# Patient Record
Sex: MALE | Race: WHITE
[De-identification: names, ages, dates, MRNs, and addresses within clinical notes are randomized per-mention and may not be internally consistent; named-entity substitution may affect disease eponyms.]

---

## 2019-01-29 NOTE — ULT
BILATERAL CAROTID DUPLEX ULTRASOUND INCLUDING COLOR AND SPECTRAL DOPPLER IMAGING:

 

History: 

Carotid bruit. 

 

FINDINGS: 

There is very extensive visual plaque with lumen diameter narrowing in both right and left proximal I
Amy, worse on the right side. 

 

PSV right  cm/sec,  cm/sec. ICA/CCA ratio is 2.3. 

 

PSV left  cm/sec, EDV 20 cm/sec. ICA/CCA ratio of 1.0. 

 

Vertebral flow is antegrade. 

 

IMPRESSION: 

Severe, greater than 70%, stenosis of the proximal right ICA. Consider non-emergent follow up CT andi
ogram for further assessment in this regard.

 

POS: ABDIRAHMAN

## 2019-02-08 ENCOUNTER — HOSPITAL ENCOUNTER (OUTPATIENT)
Dept: HOSPITAL 92 - BICULT | Age: 74
Discharge: HOME | End: 2019-02-08
Attending: INTERNAL MEDICINE
Payer: MEDICARE

## 2019-02-08 DIAGNOSIS — R93.1: ICD-10-CM

## 2019-02-08 DIAGNOSIS — K74.60: Primary | ICD-10-CM

## 2019-02-08 DIAGNOSIS — R16.1: ICD-10-CM

## 2019-02-08 PROCEDURE — 76705 ECHO EXAM OF ABDOMEN: CPT

## 2019-02-08 NOTE — ULT
HEPATIC ULTRASOUND:

 

HISTORY: 

Cirrhosis.

 

FINDINGS: 

This exam is technically limited due to bowel gas and patient body habitus.  A gallbladder is not néstor
ntified.  The patient does not state they have had a cholecystectomy.  The common duct is not well se
en but appears to be 3-4 mm.  The liver measures approximately 12 cm in length.  Mildly heterogeneous
, but no discrete abnormalities.

 

DOPPLER EVALUATION WITH SPECTRAL ANALYSIS:

Limited evaluation is seen, but flow patterns appear normal. 

 

The spleen is enlarged at 16.4 cm.  Pancreas region is obscured as is the abdominal aorta.  

 

IMPRESSION: 

1.  Technically limited examination.  The liver is mildly heterogeneous and shows no focal mass.  The
re is splenomegaly present.

 

2.  The gallbladder is not identified.

 

POS: [facility]

## 2019-02-22 ENCOUNTER — HOSPITAL ENCOUNTER (OUTPATIENT)
Dept: HOSPITAL 92 - BICCT | Age: 74
Discharge: HOME | End: 2019-02-22
Attending: FAMILY MEDICINE
Payer: MEDICARE

## 2019-02-22 DIAGNOSIS — J43.9: ICD-10-CM

## 2019-02-22 DIAGNOSIS — I65.21: Primary | ICD-10-CM

## 2019-02-22 DIAGNOSIS — I65.01: ICD-10-CM

## 2019-02-22 LAB — ESTIMATED GFR-MDRD - POC: (no result)

## 2019-02-22 PROCEDURE — 70498 CT ANGIOGRAPHY NECK: CPT

## 2019-02-22 PROCEDURE — 82565 ASSAY OF CREATININE: CPT

## 2019-02-22 NOTE — CT
CTA OF THE NECK UTILIZING IV CONTRAST AND 3D REFORMATTED IMAGING.

 

INDICATION: 

Carotid stenosis identified by carotid ultrasound dated 1/29/2019.

 

CONTRAST:

100 cc of Isovue 370 was utilized for the examination.  Three-D reformatted images were constructed f
rom the raw data at a separate work station.

 

FINDINGS: 

There is 70% luminal caliber narrowing involving the right internal carotid artery at its origin.  Th
ere is mild luminal caliber narrowing involving the proximal to mid left internal carotid artery that
 does not appear hemodynamically significantly stenosis.  The remaining cervical, petrous, precaverno
us, cavernous, and supraclinoid ICAs appear patent.  Visualized aspects of the MCAs, ACAs, and PCAs a
re patent.  There is mild to moderate luminal caliber narrowing involving the origin of the right alma
tebral artery that approaches 50%.  The left vertebral artery is widely patent.  The visualized aspec
ts of the basilar artery appear widely patent.

 

Visualized intracranial contents are unremarkable appearing.  The orbits are within normal limits.  T
he parotid, submandibular, and thyroid glands are normal appearing.  The visualized aerodigestive tra
ct appears within normal limits.  

 

There is severe emphysematous change involving the lung apices.  There is postsurgical change of a pr
ior CABG.  There is an ACDF plate involving C4 through C6.  There is multilevel spondylosis of the ce
rvical spine.

 

IMPRESSION: 

1.  70% luminal caliber narrowing involving the origin and proximal aspect of the right internal rivera
tid artery.

 

2.  50% luminal caliber narrowing involving the origin and proximal aspect of the cervical right vert
ebral artery.

 

3.  Severe emphysema.

 

POS: ABDIRAHMAN

## 2019-10-31 ENCOUNTER — HOSPITAL ENCOUNTER (OUTPATIENT)
Dept: HOSPITAL 92 - ULT | Age: 74
Discharge: HOME | End: 2019-10-31
Attending: PHYSICIAN ASSISTANT
Payer: MEDICARE

## 2019-10-31 DIAGNOSIS — R16.1: ICD-10-CM

## 2019-10-31 DIAGNOSIS — K74.60: Primary | ICD-10-CM

## 2019-10-31 DIAGNOSIS — I71.4: ICD-10-CM

## 2019-10-31 DIAGNOSIS — I85.00: ICD-10-CM

## 2019-10-31 PROCEDURE — 76705 ECHO EXAM OF ABDOMEN: CPT

## 2019-10-31 NOTE — ULT
HEPATIC SONOGRAM WITH DUPLEX EVALUATION:

 

Date:  10/31/19 

 

HISTORY:  

Cirrhosis. 

 

FINDINGS:

Gallbladder is not visualized on today's exam. No abnormalities are apparent. Common duct is 0.3 cm. 


 

Liver has a normal appearance without focal mass or intrahepatic biliary dilatation. No free fluid. 

 

The spleen measures up to 15.7 cm. Fusiform dilatation of the lower abdominal aorta measuring up to 4
.3 cm greatest AP diameter. 

 

Good color and spectral Doppler flow within the hepatic and splenic arteries. Portal venous flow is t
owards the liver. Hepatic venous flow is towards the IVC. 

 

IMPRESSION: 

1.  Moderate splenomegaly. Possibly related to portal venous hypertension. Appropriate directional po
rtal venous flow. 

 

2.  Fusiform lower abdominal aortic aneurysm, 4.3 cm. 

 

3.  Gallbladder not visualized on today's exam. 

 

 

POS: [facility]

## 2019-11-24 ENCOUNTER — HOSPITAL ENCOUNTER (INPATIENT)
Dept: HOSPITAL 92 - ERS | Age: 74
LOS: 9 days | Discharge: HOME | DRG: 280 | End: 2019-12-03
Attending: FAMILY MEDICINE | Admitting: FAMILY MEDICINE
Payer: MEDICARE

## 2019-11-24 VITALS — BODY MASS INDEX: 32.2 KG/M2

## 2019-11-24 DIAGNOSIS — I25.10: ICD-10-CM

## 2019-11-24 DIAGNOSIS — D69.3: ICD-10-CM

## 2019-11-24 DIAGNOSIS — I21.4: ICD-10-CM

## 2019-11-24 DIAGNOSIS — J18.9: ICD-10-CM

## 2019-11-24 DIAGNOSIS — I45.2: ICD-10-CM

## 2019-11-24 DIAGNOSIS — E11.9: ICD-10-CM

## 2019-11-24 DIAGNOSIS — K74.60: ICD-10-CM

## 2019-11-24 DIAGNOSIS — I11.0: Primary | ICD-10-CM

## 2019-11-24 DIAGNOSIS — Z95.1: ICD-10-CM

## 2019-11-24 DIAGNOSIS — Z79.82: ICD-10-CM

## 2019-11-24 DIAGNOSIS — D68.9: ICD-10-CM

## 2019-11-24 DIAGNOSIS — R16.1: ICD-10-CM

## 2019-11-24 DIAGNOSIS — N40.0: ICD-10-CM

## 2019-11-24 DIAGNOSIS — K21.9: ICD-10-CM

## 2019-11-24 DIAGNOSIS — I50.31: ICD-10-CM

## 2019-11-24 DIAGNOSIS — E78.5: ICD-10-CM

## 2019-11-24 DIAGNOSIS — I27.20: ICD-10-CM

## 2019-11-24 DIAGNOSIS — Z79.899: ICD-10-CM

## 2019-11-24 DIAGNOSIS — I48.4: ICD-10-CM

## 2019-11-24 LAB
ALBUMIN SERPL BCG-MCNC: 3.2 G/DL (ref 3.4–4.8)
ALP SERPL-CCNC: 96 U/L (ref 40–110)
ALT SERPL W P-5'-P-CCNC: 21 U/L (ref 8–55)
ANION GAP SERPL CALC-SCNC: 11 MMOL/L (ref 10–20)
AST SERPL-CCNC: 30 U/L (ref 5–34)
BASOPHILS # BLD AUTO: 0 THOU/UL (ref 0–0.2)
BASOPHILS NFR BLD AUTO: 0.1 % (ref 0–1)
BILIRUB SERPL-MCNC: 2.3 MG/DL (ref 0.2–1.2)
BUN SERPL-MCNC: 9 MG/DL (ref 8.4–25.7)
CALCIUM SERPL-MCNC: 8.8 MG/DL (ref 7.8–10.44)
CHLORIDE SERPL-SCNC: 106 MMOL/L (ref 98–107)
CK SERPL-CCNC: 44 U/L (ref 30–200)
CO2 SERPL-SCNC: 25 MMOL/L (ref 23–31)
CREAT CL PREDICTED SERPL C-G-VRATE: 0 ML/MIN (ref 70–130)
EOSINOPHIL # BLD AUTO: 0.1 THOU/UL (ref 0–0.7)
EOSINOPHIL NFR BLD AUTO: 1.4 % (ref 0–10)
GLOBULIN SER CALC-MCNC: 3.1 G/DL (ref 2.4–3.5)
GLUCOSE SERPL-MCNC: 137 MG/DL (ref 83–110)
HGB BLD-MCNC: 12.6 G/DL (ref 14–18)
LYMPHOCYTES # BLD: 0.5 THOU/UL (ref 1.2–3.4)
LYMPHOCYTES NFR BLD AUTO: 10.5 % (ref 21–51)
MCH RBC QN AUTO: 28.7 PG (ref 27–31)
MCV RBC AUTO: 88.4 FL (ref 78–98)
MDIFF COMPLETE?: YES
MONOCYTES # BLD AUTO: 0.4 THOU/UL (ref 0.11–0.59)
MONOCYTES NFR BLD AUTO: 8.5 % (ref 0–10)
NEUTROPHILS # BLD AUTO: 4 THOU/UL (ref 1.4–6.5)
NEUTROPHILS NFR BLD AUTO: 79.5 % (ref 42–75)
OVALOCYTES BLD QL SMEAR: (no result) (100X)
PLATELET # BLD AUTO: 39 THOU/UL (ref 130–400)
POLYCHROMASIA BLD QL SMEAR: (no result) (100X)
POTASSIUM SERPL-SCNC: 4.2 MMOL/L (ref 3.5–5.1)
RBC # BLD AUTO: 4.39 MILL/UL (ref 4.7–6.1)
SODIUM SERPL-SCNC: 138 MMOL/L (ref 136–145)
TROPONIN I SERPL DL<=0.01 NG/ML-MCNC: 0.17 NG/ML (ref ?–0.03)
TROPONIN I SERPL DL<=0.01 NG/ML-MCNC: 0.25 NG/ML (ref ?–0.03)
WBC # BLD AUTO: 5 THOU/UL (ref 4.8–10.8)

## 2019-11-24 PROCEDURE — 94640 AIRWAY INHALATION TREATMENT: CPT

## 2019-11-24 PROCEDURE — 80053 COMPREHEN METABOLIC PANEL: CPT

## 2019-11-24 PROCEDURE — 36430 TRANSFUSION BLD/BLD COMPNT: CPT

## 2019-11-24 PROCEDURE — 93005 ELECTROCARDIOGRAM TRACING: CPT

## 2019-11-24 PROCEDURE — 96361 HYDRATE IV INFUSION ADD-ON: CPT

## 2019-11-24 PROCEDURE — 82550 ASSAY OF CK (CPK): CPT

## 2019-11-24 PROCEDURE — 80048 BASIC METABOLIC PNL TOTAL CA: CPT

## 2019-11-24 PROCEDURE — 96365 THER/PROPH/DIAG IV INF INIT: CPT

## 2019-11-24 PROCEDURE — 71046 X-RAY EXAM CHEST 2 VIEWS: CPT

## 2019-11-24 PROCEDURE — 84443 ASSAY THYROID STIM HORMONE: CPT

## 2019-11-24 PROCEDURE — 86901 BLOOD TYPING SEROLOGIC RH(D): CPT

## 2019-11-24 PROCEDURE — 93798 PHYS/QHP OP CAR RHAB W/ECG: CPT

## 2019-11-24 PROCEDURE — 36415 COLL VENOUS BLD VENIPUNCTURE: CPT

## 2019-11-24 PROCEDURE — 87804 INFLUENZA ASSAY W/OPTIC: CPT

## 2019-11-24 PROCEDURE — 84479 ASSAY OF THYROID (T3 OR T4): CPT

## 2019-11-24 PROCEDURE — 85610 PROTHROMBIN TIME: CPT

## 2019-11-24 PROCEDURE — 86850 RBC ANTIBODY SCREEN: CPT

## 2019-11-24 PROCEDURE — 94760 N-INVAS EAR/PLS OXIMETRY 1: CPT

## 2019-11-24 PROCEDURE — 76536 US EXAM OF HEAD AND NECK: CPT

## 2019-11-24 PROCEDURE — 86900 BLOOD TYPING SEROLOGIC ABO: CPT

## 2019-11-24 PROCEDURE — 85379 FIBRIN DEGRADATION QUANT: CPT

## 2019-11-24 PROCEDURE — 93306 TTE W/DOPPLER COMPLETE: CPT

## 2019-11-24 PROCEDURE — 84481 FREE ASSAY (FT-3): CPT

## 2019-11-24 PROCEDURE — 96375 TX/PRO/DX INJ NEW DRUG ADDON: CPT

## 2019-11-24 PROCEDURE — 84439 ASSAY OF FREE THYROXINE: CPT

## 2019-11-24 PROCEDURE — 36416 COLLJ CAPILLARY BLOOD SPEC: CPT

## 2019-11-24 PROCEDURE — 83735 ASSAY OF MAGNESIUM: CPT

## 2019-11-24 PROCEDURE — 80076 HEPATIC FUNCTION PANEL: CPT

## 2019-11-24 PROCEDURE — 85730 THROMBOPLASTIN TIME PARTIAL: CPT

## 2019-11-24 PROCEDURE — 83605 ASSAY OF LACTIC ACID: CPT

## 2019-11-24 PROCEDURE — 71045 X-RAY EXAM CHEST 1 VIEW: CPT

## 2019-11-24 PROCEDURE — 71275 CT ANGIOGRAPHY CHEST: CPT

## 2019-11-24 PROCEDURE — 87040 BLOOD CULTURE FOR BACTERIA: CPT

## 2019-11-24 PROCEDURE — 83880 ASSAY OF NATRIURETIC PEPTIDE: CPT

## 2019-11-24 PROCEDURE — 85025 COMPLETE CBC W/AUTO DIFF WBC: CPT

## 2019-11-24 PROCEDURE — P9035 PLATELET PHERES LEUKOREDUCED: HCPCS

## 2019-11-24 PROCEDURE — 84484 ASSAY OF TROPONIN QUANT: CPT

## 2019-11-24 NOTE — RAD
XR Chest 1 View Portable



History: Cough



Comparison: Radiograph 2011



Findings: Heart size mildly enlarged. There is suggestion of mild pulmonary edema and pulmonary venou
s congestion. No pneumothorax. No significant effusion.



Multiple midline sternotomy wires.



Impression: Findings of congestive heart failure. Follow-up recommended.



Reported By: Darryn Alvarado 

Electronically Signed:  11/24/2019 4:29 PM

## 2019-11-24 NOTE — CT
CTA Angio Chest W WO Con



History: Cough



Comparison: Radiograph same day



Findings: CT in June chest performed after the intravenous ministration of contrast. 3-D rendering pr
ovided.



Pulmonary arteries are dilated. No proximal segmental pulmonary arterial filling defect.



Aortic contour is nonaneurysmal. Numerous mildly prominent paratracheal and prevascular lymph nodes.



The spleen is markedly enlarged. The gallbladder has cholelithiasis. There is fluid along the right p
aracolic gutter, incompletely evaluated.



No significant pericardial fluid.



Mild pulmonary venous congestion and early edema. There is some subpleural reticulation and lower lob
es.



No pneumothorax. No focal confluent airspace consolidation.



No thoracic spine compression fracture. There is contrast within the renal collecting systems on the 
examination indicating this is done during a delayed phase.



No displaced rib fracture.



Impression: 

1. Within the limits of this delayed exam, no pulmonary embolism.

2. Cardiomegaly and mild pulmonary venous congestion.

3. Markedly splenomegaly.

4. Cholelithiasis.

5. Small volume fluid right paracolic gutter may be sequelae of portal hypertension and hepatic dysfu
nction versus colitis.



Reported By: Darryn Alvarado 

Electronically Signed:  11/24/2019 5:45 PM

## 2019-11-25 LAB — TROPONIN I SERPL DL<=0.01 NG/ML-MCNC: 0.21 NG/ML (ref ?–0.03)

## 2019-11-25 RX ADMIN — ALOGLIPTIN SCH MG: 25 TABLET, FILM COATED ORAL at 09:42

## 2019-11-25 RX ADMIN — CEFTRIAXONE SCH MLS: 1 INJECTION, POWDER, FOR SOLUTION INTRAMUSCULAR; INTRAVENOUS at 17:47

## 2019-11-25 RX ADMIN — THERA TABS SCH TAB: TAB at 09:42

## 2019-11-25 RX ADMIN — ISOSORBIDE MONONITRATE SCH MG: 30 TABLET, EXTENDED RELEASE ORAL at 21:47

## 2019-11-25 RX ADMIN — ISOSORBIDE MONONITRATE SCH MG: 30 TABLET, EXTENDED RELEASE ORAL at 09:42

## 2019-11-25 RX ADMIN — AZITHROMYCIN SCH MLS: 500 INJECTION, POWDER, LYOPHILIZED, FOR SOLUTION INTRAVENOUS at 17:47

## 2019-11-25 RX ADMIN — Medication SCH ML: at 21:47

## 2019-11-25 NOTE — CON
DATE OF CONSULTATION:  



PRIMARY CARDIOLOGIST:  Dr. Barber Merrill.



REASON FOR CONSULTATION:  Shortness of breath, congestive heart failure, and

non-ST-elevation myocardial infarction. 



HISTORY OF PRESENT ILLNESS:  Mr. Hsu is a very pleasant 74-year-old gentleman,

patient of Dr. Merrill.  The patient initially had bypass surgery in 1991.  The

patient has had some chest pain off and on since then, but from what I can tell, no

intervention has been necessary.  He did undergo cardiac catheterization by Dr. Prajapati in the year 2000.  At that time, he was found to have 3-vessel coronary

artery disease with a patent internal mammary to the LAD, patent vein graft to the

diagonal, patent vein graft to marginal branch of the circumflex, patent graft to

the posterior descending artery.  He did have some diffuse distal disease. 



The patient was seen by Dr. Merrill a few times since then, doing well. 



The patient states that he has not been feeling well now for about 2 weeks.  He has

had a subjective fever for about 2 weeks and a cough he said, but he cannot cough

anything up.  He said he did not have a thermometer, but eventually got a

thermometer and it was sometimes at 100.6, but then yesterday went to 101.9.  He

decided to come to the emergency room.  He has been started on antibiotics. 



He did not have any shaking chills. 



The patient does not have any anginal chest pain.  He does have some chest pain with

a deep breath. 



The patient otherwise has been in relatively good physical condition.



MEDICATIONS:  

1. Saxagliptin/metformin.

2. Actos.

3. Oxybutynin.

4. Omeprazole.

5. Nadolol.

6. Isosorbide.

7. Glimepiride.

8. Benazepril.

9. Aspirin.

10. Niacin.



ALLERGIES:  NONE KNOWN.



REVIEW OF SYSTEMS:  CONSTITUTIONAL:  Positive for weakness, fatigue, and fever. 

VISION:  No changes. 

HEARING:  No changes. 

PULMONARY:  As above. 

CARDIAC:  No anginal chest pain. 

GASTROINTESTINAL:  No nausea, vomiting, or diarrhea. 

SKIN:  No rashes. 

NEUROLOGIC:  No unilateral weakness or numbness. 

PSYCHIATRIC:  No unusual depression or anxiety.



PHYSICAL EXAMINATION:

GENERAL:  This is a pleasant 74-year-old gentleman. 

VITAL SIGNS:  Blood pressure 149/86, pulse 66 and regular. 

EYES:  Sclerae nonicteric. 

MOUTH:  Mucous membranes moist. 

NECK:  Supple.  No lymphadenopathy. 

LUNGS:  He has bibasilar rales on the left side greater than the right.  No

wheezing. 

CARDIAC:  Normal S1 and normal S2. 

ABDOMEN:  Soft and nontender. 

EXTREMITIES:  Warm and dry.  No clubbing.  No cyanosis or edema.  He has good

peripheral pulses. 

SKIN:  Warm and dry.



PERTINENT LABORATORY DATA:  Troponin peak of 0.25.  Glucose 241.  .

Bilirubin 2.3.  WBC is 5.0.  D-dimer 2.9. 



IMAGING STUDIES:  The patient has had chest CTA, which was negative for thrombus.

Chest x-ray does look like some element of pulmonary vascular congestion with

cardiac enlargement.  The chest and thorax CTA revealed no pulmonary embolism.  No

evidence of cardiomegaly.  Pulmonary vascular congestion. 



Echocardiogram has been ordered. 



EKG reveals a right bundle-branch block with a left axis deviation, that is not a

new finding, bifascicular block. 



ASSESSMENT:  

1. Fever up to 101.9, probably pulmonary in origin.

2. Cough, seems to be a combination of perhaps bronchitis as well as congestive

heart failure. 

3. Congestive heart failure, most likely this represents diastolic acute on chronic.

4. Coronary artery disease with previous bypass surgery 18 years ago.



PLAN:  

1. I agree with antibiotics.

2. We will begin diuretics.

3. Change beta blockers to carvedilol.

4. Hopefully eventually can proceed to cardiac catheterization.  Right now, the

patient can lay down more than about a 45-degree angle, it would not be safe or

reasonable to proceed with catheterization at this point.  In addition, he has had a

non-ST elevation myocardial infarction, which is probably demand ischemia.  We will

anticoagulate as well, give him aspirin as well.  We will continue to follow with

you. 

5. Prognosis guarded.







Job ID:  [id number]

## 2019-11-25 NOTE — HP
CHIEF COMPLAINT:  Congestive heart failure and atypical pneumonia.



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

guard, who lives alone in [place], [place], who for the past 3 to 4 weeks has noted

increasing shortness of breath.  He had actually been in to see Dr. Randall 2 
weeks

prior, at which time he said he felt better at the time and nothing was 
determined

on his physical exam to be abnormal other than his usual issues.  In the last 2

weeks, he has noted increasing shortness of breath.  In the last several days, 
there

has been fever, diaphoresis, nausea.  No vomiting or diarrhea.  Cough, but it 
got

really bad on the day of admission, such that he could not walk to get into his 
car;

therefore, he intended to drive to the emergency room so that he called the 911 
and

they brought him into the emergency room.  There, he was noted by chest x-ray 
and CT

scan to have pulmonary edema and possibly infiltrate for pneumonia.  The 
patient has

not documented his fever.  He has been diaphoretic with severe cough and he has

dyspnea on exertion. 



PAST MEDICAL HISTORY:  He has chronic lymphocytic leukemia, has been in 
remission

for quite some time.  He is a type 2 diabetic.  He has a long history of 
coronary

artery disease with a cath done in 1991 and bypass in 1991.  He also has severe

GERD.  He has a chronic anemia felt to be related to his leukemia.  He has

hyperlipidemia.  He has type 2 diabetes, usually well controlled with 
medication.

He also has a history of BPH, hypertension. 



PAST SURGICAL HISTORY:  Includes the aforementioned cardiac bypass in 1991.  In

1988, he had severe cervical stenosis requiring surgery with Dr. Staley.  He 
had

an episode of chest pain needing a coronary artery bypass graft x5.  His last 
colonoscopy was

in 2011, where he was noted to have diverticulosis. 



SOCIAL HISTORY:  He drinks socially.  He does not smoke.  He is .



ALLERGIES:  HE HAS NO KNOWN DRUG ALLERGIES.



MEDICATIONS:  On admission include:

1. Aspirin 81 mg daily.

2. Oxybutynin 5 mg daily.

3. Kombiglyze 2.5/1000 b.i.d.

4. Imdur 30 mg b.i.d.

5. Glimepiride 2 mg b.i.d.

6. Propranolol 20 mg b.i.d.

7. Rosuvastatin 5 mg daily.

8. Niacin 500 mg two tabs b.i.d.

9. Benazepril 20 mg at bedtime.

10. Omeprazole 20 mg daily.

11. Vitamin D 5000 units daily.

12. Saw Palmetto 450 mg two tabs b.i.d.

13. He no longer takes a diuretic.



REVIEW OF SYSTEMS:  CONSTITUTIONAL:  He has general weakness, dyspnea on 
exertion,

diaphoresis, fever, general malaise. 

HEENT:  No sores or lesions or drainage from eyes, ears, nose, or throat. 

CHEST:  He has dyspnea with cough. 

CARDIOVASCULAR:  He denies chest pain or palpitations. 

GI:  He has had nausea, but no vomiting or diarrhea. 

:  No dysuria.  He has chronic urinary frequency.  No blood in urine or 
stool. 

MUSCULOSKELETAL:  No new aches or pains or joint problems. 

SKIN:  No new rashes or lesions. 

NEUROLOGIC:  No trouble with mentation, headaches, or confusion.



PHYSICAL EXAMINATION:

At the time of admission, 

VITAL SIGNS:  Blood pressure 119/55, respirations 16, pulse 66, temperature 98.6
, O2

saturation is 95% on room air. 

GENERAL:  This is an obese  male, alert, oriented, cooperative. 

HEENT:  Normocephalic, atraumatic.  Pupils are equal, round, and reactive to 
light

with arcus senilis bilaterally.  TMs, nares, and his pharynx are clear. 

NECK:  Supple. 

CHEST:  Unable to appreciate bruit or mass.  Chest with diminished breath 
sounds and

audible rales bilaterally. 

HEART:  Regular rate and rhythm without murmur. 

ABDOMEN:  Obese.  Able to appreciate splenomegaly also noted on CT exam.  No 
other

masses or tenderness noted. 

:  Deferred. 

EXTREMITIES:  Without clubbing, cyanosis, or edema. 

SKIN:  Without rashes or lesions. 

NEUROLOGIC:  Cranial nerves are intact.  Gait and cerebellar function untested.

Sensory exam is generally intact.  Mental status is clear. 



LABORATORY DATA:  Lab work thus far shows WBCs 5, hemoglobin 12.6, hematocrit 
38.8

with platelets at 39.  The D-dimer elevated at 2.9.  Sodium 138, potassium 4.2,

chloride 106, CO2 is 25, BUN 9, creatinine 0.7, glucose 137, lactic acid 1.7, 
total

bilirubin 2.3.  Other liver functions unremarkable.  Cardiac enzymes elevated at

0.1, all the way up to 0.25.  His BNP elevated at 393.  Chest x-ray showed what

appears to be pulmonary edema.  CT scan confirms a splenomegaly, also noted 
indolent

pneumonia probably present as well. 



ASSESSMENT:  

1. Congestive heart failure, acute exacerbation.

2. Atypical pneumonia.

3. Thrombocytopenia.

4. History of chronic lymphocytic leukemia.

5. Type 2 diabetes.

6. Hypertension.

7. Dyslipidemia.

8. History of coronary artery disease with coronary artery bypass graft x5.



PLAN:  Telemetry.  Continue IV medications.  Consult Dr. Merrill, 
echocardiogram.

Gradual diuresis and serial re-evaluation. 







Job ID:  [id number]



MTDD

## 2019-11-26 LAB
ANION GAP SERPL CALC-SCNC: 7 MMOL/L (ref 10–20)
ANISOCYTOSIS BLD QL SMEAR: (no result) (100X)
APTT PPP: 41.8 SEC (ref 22.9–36.1)
BASOPHILS # BLD AUTO: 0 THOU/UL (ref 0–0.2)
BASOPHILS NFR BLD AUTO: 0.1 % (ref 0–1)
BUN SERPL-MCNC: 11 MG/DL (ref 8.4–25.7)
CALCIUM SERPL-MCNC: 8.2 MG/DL (ref 7.8–10.44)
CHLORIDE SERPL-SCNC: 109 MMOL/L (ref 98–107)
CO2 SERPL-SCNC: 31 MMOL/L (ref 23–31)
CREAT CL PREDICTED SERPL C-G-VRATE: 120 ML/MIN (ref 70–130)
EOSINOPHIL # BLD AUTO: 0.1 THOU/UL (ref 0–0.7)
EOSINOPHIL NFR BLD AUTO: 2.6 % (ref 0–10)
GLUCOSE SERPL-MCNC: 66 MG/DL (ref 83–110)
HGB BLD-MCNC: 10.7 G/DL (ref 14–18)
HGB BLD-MCNC: 11.8 G/DL (ref 14–18)
INR PPP: 1.4
LYMPHOCYTES # BLD: 0.7 THOU/UL (ref 1.2–3.4)
LYMPHOCYTES NFR BLD AUTO: 16.8 % (ref 21–51)
MCH RBC QN AUTO: 27.9 PG (ref 27–31)
MCH RBC QN AUTO: 28 PG (ref 27–31)
MCV RBC AUTO: 89.3 FL (ref 78–98)
MCV RBC AUTO: 90 FL (ref 78–98)
MDIFF COMPLETE?: YES
MONOCYTES # BLD AUTO: 0.4 THOU/UL (ref 0.11–0.59)
MONOCYTES NFR BLD AUTO: 9.3 % (ref 0–10)
NEUTROPHILS # BLD AUTO: 2.8 THOU/UL (ref 1.4–6.5)
NEUTROPHILS NFR BLD AUTO: 71.1 % (ref 42–75)
PLATELET # BLD AUTO: 26 THOU/UL (ref 130–400)
PLATELET # BLD AUTO: 31 THOU/UL (ref 130–400)
POTASSIUM SERPL-SCNC: 4.1 MMOL/L (ref 3.5–5.1)
PROTHROMBIN TIME: 17.3 SEC (ref 12–14.7)
RBC # BLD AUTO: 3.84 MILL/UL (ref 4.7–6.1)
RBC # BLD AUTO: 4.22 MILL/UL (ref 4.7–6.1)
SODIUM SERPL-SCNC: 143 MMOL/L (ref 136–145)
T4 FREE SERPL-MCNC: 1.15 NG/DL (ref 0.7–1.48)
WBC # BLD AUTO: 3.2 THOU/UL (ref 4.8–10.8)
WBC # BLD AUTO: 3.9 THOU/UL (ref 4.8–10.8)

## 2019-11-26 RX ADMIN — ALOGLIPTIN SCH MG: 25 TABLET, FILM COATED ORAL at 08:24

## 2019-11-26 RX ADMIN — Medication SCH ML: at 20:34

## 2019-11-26 RX ADMIN — ISOSORBIDE MONONITRATE SCH MG: 30 TABLET, EXTENDED RELEASE ORAL at 20:33

## 2019-11-26 RX ADMIN — CEFTRIAXONE SCH MLS: 1 INJECTION, POWDER, FOR SOLUTION INTRAMUSCULAR; INTRAVENOUS at 16:51

## 2019-11-26 RX ADMIN — THERA TABS SCH TAB: TAB at 08:25

## 2019-11-26 RX ADMIN — Medication SCH ML: at 08:25

## 2019-11-26 RX ADMIN — ALUMINUM ZIRCONIUM TRICHLOROHYDREX GLY SCH: 0.2 STICK TOPICAL at 17:08

## 2019-11-26 RX ADMIN — ISOSORBIDE MONONITRATE SCH MG: 30 TABLET, EXTENDED RELEASE ORAL at 08:24

## 2019-11-26 RX ADMIN — AZITHROMYCIN SCH MLS: 500 INJECTION, POWDER, LYOPHILIZED, FOR SOLUTION INTRAVENOUS at 15:07

## 2019-11-26 NOTE — CON
DATE OF CONSULTATION:  



REASON FOR CONSULTATION:  Thrombocytopenia.



HISTORY OF PRESENT ILLNESS:  Mr. Hsu is a pleasant gentleman, who is [facility] Darryn Randall, who presented to the emergency room with

increasing shortness of breath.  He also had a fever for approximately 2 weeks.  He

was admitted for further workup.  While showering, he began to have some chest pain

during this admission and Cardiology was consulted.  Mr. Hsu has a longstanding

history of chronic iron-deficient anemia and mild chronic thrombocytopenia.  He has

been seen by Dr. Bernstein in , and after a prolonged absence again in .  His

platelets in 2017 were 58,000.  He most recently has been seen by Dr. Lniton and

apparently has a new diagnosis of cirrhosis.  On this admission, he underwent a

chest and thorax CT angio.  There was no PE.  There was pulmonary venous congestion,

cardiomegaly, splenomegaly and possible portal hypertension.  The patient's

platelets on arrival were 39,000, it dropped to 26,000 early this morning.  We were

asked to see the patient regarding his thrombocytopenia.  The patient denies any

complaints at this time.  He has no shortness of breath.  No chest pain.  He has no

bleeding, bruising, or rash. 



PAST MEDICAL HISTORY:  

1. History of iron-deficient anemia with a negative GI workup.

2. Chronic ITP.

3. Type 2 diabetes.

4. Coronary artery disease.

5. Hyperlipidemia.



PAST SURGICAL HISTORY:  

1. Coronary artery bypass grafting.

2. Back surgery.



ALLERGIES:  NO KNOWN DRUG ALLERGIES.



HOME MEDICATIONS:  

1. Aspirin.

2. Benazepril.

3. Glimepiride.

4. Isosorbide.

5. Nadolol.

6. Niacin.

7. Prilosec.

8. Oxybutynin.

9. Pioglitazone.

10. Saw palmetto.

11. Saxagliptin/metformin.



FAMILY HISTORY:  Father  from lung cancer.  His brother and son have

hemochromatosis.  The patient has been tested in the past and was negative. 



SOCIAL HISTORY:  , has 2 children.  Lives alone.  Former smoker.  No alcohol

or illicit drug use. 



REVIEW OF SYSTEMS:  A 10-point review of systems is negative except for noted in HPI.



PHYSICAL EXAMINATION:

VITAL SIGNS:  Temperature is 97.8, pulse is 81, respiratory rate 18, BP is 103/52.

He is 95% on 4 L. 

GENERAL:  This is a well-developed, well-nourished male, in no acute distress. 

HEENT:  Normocephalic, atraumatic.  Pupils are equal and reactive to light. 

NECK:  Supple. 

CV:  Regular rate and rhythm. 

LUNGS:  Clear. 

ABDOMEN:  Soft and nontender.  Bowel sounds are positive. 

EXTREMITIES:  There is no clubbing or cyanosis. 

SKIN:  No rash. 

HEMATOLOGICAL:  There is no petechiae or purpura. 

NEUROLOGICAL:  Nonfocal.



PERTINENT LABORATORY DATA AND X-RAYS:  Current WBCs are 3.9, hemoglobin 11.8,

hematocrit 38.0, platelet count is 31,000, 71% neutrophils, 16% lymphocytes.  PT

7.3, INR is 1.4, and PTT is 41.8.  Sodium is 143, potassium 4.1, chloride 109, CO2

is 31, BUN is 11, creatinine 0.7, bilirubin is 2.3, AST is 30, ALT is 21, alkaline

phosphatase is 96.  Troponin is 0.207.  BNP is 393.  Serum total protein is 6.3,

albumin 3.2, globulin 3.1. 



ASSESSMENT:  

1. Chronic thrombocytopenia.

2. Possible cirrhosis with splenomegaly and pulmonary hypertension.

3. Coronary artery disease.



DISCUSSION:  The patient has chronic ITP, which may have recently worsened secondary

to his splenomegaly and cirrhosis.  He does not require a platelet transfusion at

this time and can follow up with GI for further workup.  If he is to have a heart

catheterization, I would recommend platelet transfusion to get his platelets above

50,000.  No further workup at this time.  I am unaware of a history of CLL.  I have

no documentation regarding that, so I will speak further with the patient regarding

this.  There is certainly no evidence of active CLL at this time. 



Thank you for the consult.  We will follow along with his hospital course.







Job ID:  [id number]

## 2019-11-26 NOTE — RAD
PA AND LATERAL CHEST:

 

Date:  11/26/19 

 

HISTORY:  

Chest pain. 

 

COMPARISON:  

11/13/08 study. 

 

FINDINGS:

Heart size appears borderline size with postop sternotomy change. There are chronic appearing lung ch
anges present. No focal infiltrative process. 

 

IMPRESSION: 

Chronic appearing lung change. Interstitial changes are slightly more prominent than on the previous 
exam. There could be some minimal element of edema present. 

 

 

POS: [facility]

## 2019-11-26 NOTE — PRG
DATE OF SERVICE:  11/26/2019



SUBJECTIVE:  Mr. Hsu is doing well.  However, he did cough up some blood this

morning. 



He still had an occasional episode of what sounds like angina. 



He is otherwise doing better.



OBJECTIVE:  VITAL SIGNS:  His blood pressure is 112/55, pulse 65 and regular. 

LUNGS:  Clear. 

CARDIAC:  Normal S1, normal S2. 

ABDOMEN:  Soft, nontender. 



Echocardiogram showed normal left ventricular function.  The patient's platelet

count, however, is extremely low at 26,000 with a repeat of 31,000. 



ASSESSMENT:  

1. Severely low platelet count.

2. Diastolic heart failure, improved.

3. Probably pneumonia, improved.

4. Previous bypass surgery.



PLAN:  

1. He got one dose of Lovenox yesterday and was not given any further Lovenox.

2. Would recommend a Hematology consult.  I do not think it is safe to proceed to

invasive therapy at the present time with a platelet count being this low. 

3. We will give another dose of Lasix.

4. Add nitrates and we will discuss with Dr. Randall.







Job ID:  [id number]

## 2019-11-27 LAB
ALBUMIN SERPL BCG-MCNC: 2.8 G/DL (ref 3.4–4.8)
ALP SERPL-CCNC: 76 U/L (ref 40–110)
ALT SERPL W P-5'-P-CCNC: 20 U/L (ref 8–55)
ANION GAP SERPL CALC-SCNC: 9 MMOL/L (ref 10–20)
AST SERPL-CCNC: 30 U/L (ref 5–34)
BASOPHILS # BLD AUTO: 0 THOU/UL (ref 0–0.2)
BASOPHILS NFR BLD AUTO: 0.6 % (ref 0–1)
BILIRUB DIRECT SERPL-MCNC: 0.7 MG/DL (ref 0.1–0.3)
BILIRUB SERPL-MCNC: 1.4 MG/DL (ref 0.2–1.2)
BUN SERPL-MCNC: 13 MG/DL (ref 8.4–25.7)
CALCIUM SERPL-MCNC: 8.1 MG/DL (ref 7.8–10.44)
CHLORIDE SERPL-SCNC: 107 MMOL/L (ref 98–107)
CO2 SERPL-SCNC: 30 MMOL/L (ref 23–31)
CREAT CL PREDICTED SERPL C-G-VRATE: 122 ML/MIN (ref 70–130)
EOSINOPHIL # BLD AUTO: 0.1 THOU/UL (ref 0–0.7)
EOSINOPHIL NFR BLD AUTO: 2.4 % (ref 0–10)
GLUCOSE SERPL-MCNC: 101 MG/DL (ref 83–110)
HGB BLD-MCNC: 10.8 G/DL (ref 14–18)
LYMPHOCYTES # BLD: 0.9 THOU/UL (ref 1.2–3.4)
LYMPHOCYTES NFR BLD AUTO: 26.8 % (ref 21–51)
MAGNESIUM SERPL-MCNC: 1.6 MG/DL (ref 1.6–2.6)
MCH RBC QN AUTO: 28 PG (ref 27–31)
MCV RBC AUTO: 88.9 FL (ref 78–98)
MDIFF COMPLETE?: YES
MONOCYTES # BLD AUTO: 0.4 THOU/UL (ref 0.11–0.59)
MONOCYTES NFR BLD AUTO: 11.5 % (ref 0–10)
NEUTROPHILS # BLD AUTO: 2.1 THOU/UL (ref 1.4–6.5)
NEUTROPHILS NFR BLD AUTO: 58.7 % (ref 42–75)
OVALOCYTES BLD QL SMEAR: (no result) (100X)
PLATELET # BLD AUTO: 38 THOU/UL (ref 130–400)
POTASSIUM SERPL-SCNC: 4.2 MMOL/L (ref 3.5–5.1)
RBC # BLD AUTO: 3.84 MILL/UL (ref 4.7–6.1)
SODIUM SERPL-SCNC: 142 MMOL/L (ref 136–145)
WBC # BLD AUTO: 3.5 THOU/UL (ref 4.8–10.8)

## 2019-11-27 RX ADMIN — ISOSORBIDE MONONITRATE SCH MG: 30 TABLET, EXTENDED RELEASE ORAL at 19:52

## 2019-11-27 RX ADMIN — CEFTRIAXONE SCH MLS: 1 INJECTION, POWDER, FOR SOLUTION INTRAMUSCULAR; INTRAVENOUS at 16:16

## 2019-11-27 RX ADMIN — Medication SCH ML: at 08:52

## 2019-11-27 RX ADMIN — AZITHROMYCIN SCH MLS: 500 INJECTION, POWDER, LYOPHILIZED, FOR SOLUTION INTRAVENOUS at 17:31

## 2019-11-27 RX ADMIN — ISOSORBIDE MONONITRATE SCH MG: 30 TABLET, EXTENDED RELEASE ORAL at 08:52

## 2019-11-27 RX ADMIN — ALOGLIPTIN SCH MG: 25 TABLET, FILM COATED ORAL at 08:51

## 2019-11-27 RX ADMIN — ASPIRIN SCH MG: 81 TABLET ORAL at 08:52

## 2019-11-27 RX ADMIN — Medication SCH ML: at 19:58

## 2019-11-27 RX ADMIN — THERA TABS SCH TAB: TAB at 08:52

## 2019-11-27 NOTE — RAD
EXAM: Single view of the chest



HISTORY:   Pneumonia



COMPARISON: 11/24/2019



FINDINGS: Single view of the chest shows an enlarged but stable cardiomediastinal silhouette.  The pa
tient is status post CABG.  There is no evidence of consolidation, mass, or pleural effusion. The

bones are unremarkable.



IMPRESSION: No evidence of acute cardiopulmonary disease



Reported By: Levon Casas 

Electronically Signed:  11/27/2019 8:18 AM

## 2019-11-27 NOTE — CON
DATE OF CONSULTATION:  11/27/2019



HISTORY OF PRESENT ILLNESS:  I am seeing Mr. Hsu at our [facility]

telemetry floor as an electrophysiology consultant.  His problems are: 

1. Newly found atypical appearing atrial flutter.

    a. Prompt response to amiodarone noted.

2. Coronary artery disease.

    a. History of coronary artery bypass grafting surgery in the past.

    b. Presentation of non-ST-elevation myocardial infarction.

c. Preserved LVEF on 2D echo at 55% to 60%, moderate to severely enlarged left

atrium, mild mitral regurgitation, tricuspid regurgitation, mild pulmonary artery

pressure elevation on echo on 11/25/2019. 

3. Bifascicular block with right bundle and left axis deviation on baseline EKG.

4. 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

5. Type 2 diabetes.

6. Diastolic heart failure with exacerbation.

7. History of chronic lymphocytic leukemia.

8. Atypical pneumonia.



ALLERGIES:  NONE NOTED.



MEDICATIONS:  At home included:

1. Saxagliptin/metformin.

2. Pioglitazone.

3. Oxybutynin.

4. Omeprazole.

5. Nadolol.

6. Isosorbide mononitrate.

7. Glimepiride.

8. Benazepril.

9. Saw palmetto.

10. Aspirin.

11. Niacin.



SUBJECTIVE:  Mr. Hsu was admitted on the 24th with progressive dyspnea for the

last 2 weeks.  He had cough progressively worsening on day of admission so much so

he had to call the EMS.  On chest x-ray and CT scan, he had noted to have pulmonary

edema, possible infiltrating pneumonia was also diagnosed.  He was initiated IV

ceftriaxone as well as diuretics.  Now seems to be remarkably improved.  While on

monitoring though he developed episodes of rapid narrow complex tachycardia with QRS

morphology similar to baseline, but with rates up to 150 beats per minute.  This

rhythm promptly terminated with IV amiodarone bolus. 



He also was noted to have elevated troponin levels. 



Dr. Abraham consulted me for further arrhythmia management. 



Currently, the patient is afebrile.  Denies chest pain.  No palpitations.  Not aware

of the rapid heartbeats.  He had no stroke-like symptoms.  No neurological deficits.

 Rest of 12-point review of systems is otherwise unremarkable. 



PAST MEDICAL HISTORY:  As above.



SOCIAL HISTORY:  The patient denies smoking, EtOH, or drug abuse.  He does drink

socially.  He is . 



FAMILY HISTORY:  Not contributory.



OBJECTIVE DATA:  VITAL SIGNS:  Blood pressure is 103/52, heart rate 78, respirations

24, temperature 97.7 degrees Fahrenheit. 

GENERAL:  Alert and oriented man, in no apparent distress. 

NECK:  Supple.  Jugular vein is not distended. 

CHEST:  Coarse without crackles. 

HEART:  Sounds are regular to rate and rhythm.  No murmur or gallop. 

ABDOMEN:  Benign.  Bowel sounds positive. 

EXTREMITIES:  Lower extremities without edema, clubbing, or cyanosis.  Pulses are

adequate. 

NEUROLOGIC:  The patient is nonfocal. 

MUSCULOSKELETAL:  Without joint swelling or deformity. 

SKIN:  Without rash.  Midsternal scar is appreciated.



DATABASE:  EKG is reviewed.  Again reveals sinus rhythm with bifascicular block and

rate of 68 beats per minute on November 24, 2019.  Telemetry strips do reveal an

episode of wide-complex tachycardia, although similar morphology to baseline with

difficult to visualize P wave, but possibly 2:1 conduction is noted suggestive of

atrial flutter.  It does not appear to be typical isthmus-dependent flutter in

morphology.  Now the patient is back in sinus rhythm. 



LABORATORY DATA:  White cell count is 3.5, hemoglobin 10.8, platelet count is 38.

The INR 1.4.  Sodium 142, potassium 4.2, BUN is 13, creatinine 0.68.  AST and ALT

are 30 and 20, total bilirubin is 1.4.  BNP on presentation is 393. 



ASSESSMENT AND PLAN:  Mr. Hsu is a 74-year-old man with history of coronary

artery disease, prior bypass surgery, possible diastolic heart failure/pneumonia,

causing respiratory distress and the current admission.  He also had elevated

troponin levels, albeit positive mild up to 0.25 and 0.307.  Now his initial

symptoms improved, but had developed transient likely atrial flutter episode with

2:1 AV conduction, which appears to be atypical in morphology.  He did respond to

amiodarone. 



I discussed the diagnosis and potential treatment options with the patient as well

as with Dr. Abraham.  It appears to me that the initiated IV amiodarone has been

highly effective of terminating the arrhythmia.  Hence, the acute illness and

potential coronary issues, which may require further ischemic evaluation at this

point is reasonable to continue IV amiodarone, although long-term administration of

drug could potentially mar by side effects like pulmonary, liver, and thyroid

toxicity.  On the other hand, antiarrhythmic choices are limited, hence, history of

coronary artery disease and diastolic heart failure.  Possibly sotalol could be a

potential option if in the future alternative agent is desired.  On the short term,

though I think it is reasonable to continue IV to p.o. amiodarone and likely taper

off as an outpatient.  Should the episodes frequently recur or should it more

prolonged, it was a difficult decision to put this gentleman on chronic

anticoagulation, especially in view of his thrombocytopenia.  For now, I would hold

off on that.  Hence, a short duration of the episode. 



I have to follow with this gentleman as an outpatient.  Thank you again for letting

me to participate in the care of this patient. 







Job ID:  [id number]

## 2019-11-27 NOTE — PRG
DATE OF SERVICE:  11/27/2019



SUBJECTIVE:  Mr. Hsu is doing fine this morning.  No chest pain.  No shortness of

breath. 



The patient did have an episode of tachycardia last night as will be outlined below.

 He is now in sinus rhythm. 



OBJECTIVE:  VITAL SIGNS:  Blood pressure 124/58, pulse 66 and regular. 

HEENT:  Eyes; sclerae, nonicteric.  Mouth, mucous membranes moist. 

LUNGS:  Clear. 

CARDIAC:  Normal S1, normal S2. 

ABDOMEN:  Soft and nontender. 

EXTREMITIES:  No edema.



PERTINENT LABORATORY DATA:  The patient's platelet count is 38,000, white count is

3.5.  The INR yesterday was 1.4, PTT was 41.8, indicating he also has a

coagulopathy. 



ASSESSMENT:  

1. Bifascicular block.

2. Previous coronary artery bypass grafting.

3. Cardiac catheterization done in the year 2000 by Dr. Prajapati showed a patent

internal mammary to the LAD, a small diffusely diseased to LAD, patent diagonal

saphenous vein graft to a diagonal, patent saphenous vein graft to an obtuse

marginal, patent saphenous vein graft to the right coronary artery. 

4. Low platelet count thought to be related to ITP.

5. Coagulopathy, probably hepatic in origin.

6. Non-ST elevation myocardial infarction, prior demand ischemia.

7. Tachycardia.  Same morphology is the resting EKG with a bifascicular block

pattern, although somewhat wider.  He was started on amiodarone last night and

maintains on that today. 



PLAN:  

1. We will talk with Dr. Bernstein about the possibility of prednisone to see if we

can get the platelet count higher.  The problem would be if stenting is indicated,

then really likely can be done safely without using dual anti-platelet drugs at

least for a month and I think that would be safe with a platelet count below 50,000,

also some coagulopathy. 

2. Consult with Dr. Ayala about the dysrhythmia.

3. Prognosis is guarded in this gentleman with multiple problems.  I do not think it

is safe to proceed to catheterization today.  In this situation, he will be a poor

candidate for bypass surgery with low platelet count and coagulopathy, especially

redo operation and also would be a poor candidate for percutaneous therapy with a

platelet count of 38,000 and coagulopathy.  Risk of bleeding would be high and it is

unclear whether dual anti-platelet drugs could even be used. 







Job ID:  [id number]

## 2019-11-28 LAB
BASOPHILS # BLD AUTO: 0 THOU/UL (ref 0–0.2)
BASOPHILS NFR BLD AUTO: 0.9 % (ref 0–1)
EOSINOPHIL # BLD AUTO: 0.1 THOU/UL (ref 0–0.7)
EOSINOPHIL NFR BLD AUTO: 3.4 % (ref 0–10)
HGB BLD-MCNC: 10.1 G/DL (ref 14–18)
LYMPHOCYTES # BLD: 0.6 THOU/UL (ref 1.2–3.4)
LYMPHOCYTES NFR BLD AUTO: 22.4 % (ref 21–51)
MCH RBC QN AUTO: 28.6 PG (ref 27–31)
MCV RBC AUTO: 89.6 FL (ref 78–98)
MONOCYTES # BLD AUTO: 0.3 THOU/UL (ref 0.11–0.59)
MONOCYTES NFR BLD AUTO: 12 % (ref 0–10)
NEUTROPHILS # BLD AUTO: 1.7 THOU/UL (ref 1.4–6.5)
NEUTROPHILS NFR BLD AUTO: 61.3 % (ref 42–75)
PLATELET # BLD AUTO: 41 THOU/UL (ref 130–400)
RBC # BLD AUTO: 3.53 MILL/UL (ref 4.7–6.1)
WBC # BLD AUTO: 2.8 THOU/UL (ref 4.8–10.8)

## 2019-11-28 RX ADMIN — Medication SCH ML: at 20:18

## 2019-11-28 RX ADMIN — ASPIRIN SCH MG: 81 TABLET ORAL at 08:18

## 2019-11-28 RX ADMIN — THERA TABS SCH TAB: TAB at 08:19

## 2019-11-28 RX ADMIN — AZITHROMYCIN SCH MLS: 500 INJECTION, POWDER, LYOPHILIZED, FOR SOLUTION INTRAVENOUS at 16:09

## 2019-11-28 RX ADMIN — CEFTRIAXONE SCH MLS: 1 INJECTION, POWDER, FOR SOLUTION INTRAMUSCULAR; INTRAVENOUS at 17:59

## 2019-11-28 RX ADMIN — ISOSORBIDE MONONITRATE SCH MG: 30 TABLET, EXTENDED RELEASE ORAL at 20:17

## 2019-11-28 RX ADMIN — ALOGLIPTIN SCH MG: 25 TABLET, FILM COATED ORAL at 08:19

## 2019-11-28 RX ADMIN — ISOSORBIDE MONONITRATE SCH MG: 30 TABLET, EXTENDED RELEASE ORAL at 08:18

## 2019-11-28 RX ADMIN — Medication SCH ML: at 08:20

## 2019-11-29 LAB
BASOPHILS # BLD AUTO: 0 THOU/UL (ref 0–0.2)
BASOPHILS NFR BLD AUTO: 0.8 % (ref 0–1)
EOSINOPHIL # BLD AUTO: 0.1 THOU/UL (ref 0–0.7)
EOSINOPHIL NFR BLD AUTO: 2.2 % (ref 0–10)
HGB BLD-MCNC: 10.2 G/DL (ref 14–18)
LYMPHOCYTES # BLD: 0.8 THOU/UL (ref 1.2–3.4)
LYMPHOCYTES NFR BLD AUTO: 22.8 % (ref 21–51)
MCH RBC QN AUTO: 28.4 PG (ref 27–31)
MCV RBC AUTO: 88.5 FL (ref 78–98)
MONOCYTES # BLD AUTO: 0.3 THOU/UL (ref 0.11–0.59)
MONOCYTES NFR BLD AUTO: 10 % (ref 0–10)
NEUTROPHILS # BLD AUTO: 2.1 THOU/UL (ref 1.4–6.5)
NEUTROPHILS NFR BLD AUTO: 64.2 % (ref 42–75)
PLATELET # BLD AUTO: 60 THOU/UL (ref 130–400)
RBC # BLD AUTO: 3.59 MILL/UL (ref 4.7–6.1)
WBC # BLD AUTO: 3.3 THOU/UL (ref 4.8–10.8)

## 2019-11-29 RX ADMIN — ALOGLIPTIN SCH MG: 25 TABLET, FILM COATED ORAL at 08:47

## 2019-11-29 RX ADMIN — ISOSORBIDE MONONITRATE SCH MG: 30 TABLET, EXTENDED RELEASE ORAL at 08:47

## 2019-11-29 RX ADMIN — Medication SCH ML: at 08:50

## 2019-11-29 RX ADMIN — THERA TABS SCH TAB: TAB at 08:47

## 2019-11-29 RX ADMIN — ISOSORBIDE MONONITRATE SCH MG: 30 TABLET, EXTENDED RELEASE ORAL at 20:45

## 2019-11-29 RX ADMIN — Medication SCH ML: at 20:46

## 2019-11-29 RX ADMIN — ASPIRIN SCH MG: 81 TABLET ORAL at 08:47

## 2019-11-29 NOTE — EKG
Test Reason : 

Blood Pressure : ***/*** mmHG

Vent. Rate : 068 BPM     Atrial Rate : 068 BPM

   P-R Int : 184 ms          QRS Dur : 118 ms

    QT Int : 416 ms       P-R-T Axes : 101 -30 015 degrees

   QTc Int : 442 ms

 

Normal sinus rhythm

Left axis deviation

Right bundle branch block

T wave inversion V2, V3

Abnormal ECG

 

Confirmed by LALY MENDOZA, MELANIE ([id number]),  NICHOLAS SERRANO ([id number]) on 11/29/2019 3:44:06 PM

 

Referred By:             Confirmed By:MELANIE RUFFIN MD

## 2019-11-30 LAB
BASOPHILS # BLD AUTO: 0 THOU/UL (ref 0–0.2)
BASOPHILS NFR BLD AUTO: 0.2 % (ref 0–1)
EOSINOPHIL # BLD AUTO: 0.1 THOU/UL (ref 0–0.7)
EOSINOPHIL NFR BLD AUTO: 2.1 % (ref 0–10)
HGB BLD-MCNC: 10.1 G/DL (ref 14–18)
LYMPHOCYTES # BLD: 0.6 THOU/UL (ref 1.2–3.4)
LYMPHOCYTES NFR BLD AUTO: 19.1 % (ref 21–51)
MCH RBC QN AUTO: 28.5 PG (ref 27–31)
MCV RBC AUTO: 88.9 FL (ref 78–98)
MONOCYTES # BLD AUTO: 0.3 THOU/UL (ref 0.11–0.59)
MONOCYTES NFR BLD AUTO: 8.7 % (ref 0–10)
NEUTROPHILS # BLD AUTO: 2 THOU/UL (ref 1.4–6.5)
NEUTROPHILS NFR BLD AUTO: 70 % (ref 42–75)
PLATELET # BLD AUTO: 46 THOU/UL (ref 130–400)
RBC # BLD AUTO: 3.56 MILL/UL (ref 4.7–6.1)
WBC # BLD AUTO: 2.9 THOU/UL (ref 4.8–10.8)

## 2019-11-30 RX ADMIN — ISOSORBIDE MONONITRATE SCH MG: 30 TABLET, EXTENDED RELEASE ORAL at 10:53

## 2019-11-30 RX ADMIN — THERA TABS SCH TAB: TAB at 09:11

## 2019-11-30 RX ADMIN — ASPIRIN SCH MG: 81 TABLET ORAL at 09:12

## 2019-11-30 RX ADMIN — ALOGLIPTIN SCH MG: 25 TABLET, FILM COATED ORAL at 09:12

## 2019-11-30 RX ADMIN — ISOSORBIDE MONONITRATE SCH: 30 TABLET, EXTENDED RELEASE ORAL at 11:04

## 2019-11-30 RX ADMIN — Medication SCH ML: at 20:20

## 2019-11-30 RX ADMIN — Medication SCH ML: at 09:12

## 2019-11-30 NOTE — PDOC.CPN
- Subjective


Date: 11/30/19


Time: 16:39


Interval history: 





No new issues, No angina. 





- Review of Systems


General: denies: fever/chills, weight/appetite/sleep changes, night sweats, 

fatigue


Respiratory: denies: cough, congestion, shortness of breath, exercise 

intolerance


Cardiovascular: denies: chest pain, palpitation, edema, paroxysmal nocturnal 

dyspnea, orthopnea


Gastrointestinal: denies: nausea, vomiting, diarrhea, constipation, abd pain, 

GI bleeding


Musculoskeletal: denies: pain, tenderness, stiffness, swelling, arthritis/

arthralgias


Neurological: denies: numbness, syncope, seizure, weakness





- Objective


Allergies/Adverse Reactions: 


 Allergies











Allergy/AdvReac Type Severity Reaction Status Date / Time


 


No Known Allergies Allergy   Verified 11/25/19 22:46











Visit Medications: 


 Current Medications





Albuterol/Ipratropium (Duoneb)  3 ml NEB Q4H PRN


   PRN Reason:  SOB


Albuterol/Ipratropium (Duoneb)  3 ml NEB QID-RT [facility]


   Last Admin: 11/30/19 14:36 Dose:  3 ml


Alogliptin Benzoate (Alogliptin)  25 mg PO DAILY [facility]


   Last Admin: 11/30/19 09:12 Dose:  25 mg


Amiodarone HCl (Cordarone)  400 mg PO BID [facility]


   Last Admin: 11/30/19 10:53 Dose:  400 mg


Aspirin (Ecotrin)  81 mg PO DAILY [facility]


   Last Admin: 11/30/19 09:12 Dose:  81 mg


Azithromycin (Zithromax)  250 mg PO 1600 [facility]


   Last Admin: 11/29/19 16:08 Dose:  250 mg


Carvedilol (Coreg)  6.25 mg PO BID-[facility]


   Last Admin: 11/30/19 10:54 Dose:  6.25 mg


Cefdinir (Omnicef)  300 mg PO BID [facility]


   Stop: 12/04/19 09:01


   Last Admin: 11/30/19 09:11 Dose:  300 mg


Dextrose/Water (Dextrose 50%)  25 gm IVP PRN PRN


   PRN Reason: HYPOGLYCEMIA PROTOCOL


Furosemide (Lasix)  20 mg PO QAM [facility]


   Last Admin: 11/30/19 10:54 Dose:  20 mg


Glucagon (Glucagon)  1 mg IM PRN PRN


   PRN Reason: HYPOGLYCEMIA PROTOCOL


Dextrose/Water (D5w)  1,000 mls @ 0 mls/hr IV INF PRN


   PRN Reason: HYPOGLYCEMIA PROTOCOL


Insulin Human Regular (Humulin R)  0 units SC .MILD SLIDING PRN; Protocol


   PRN Reason: MILD SLIDING SCALE


Insulin Human Regular (Humulin R)  0 units SC .BEDTIME SLIDING SC PRN; Protocol


   PRN Reason: BEDTIME SLIDING SCALE


Isosorbide Mononitrate (Imdur Er)  30 mg PO BID [facility]


   Last Admin: 11/30/19 11:04 Dose:  Not Given


Lisinopril (Zestril)  20 mg PO DAILY [facility]


   Last Admin: 11/30/19 10:53 Dose:  20 mg


Multivitamins (Theragran)  1 tab PO DAILY [facility]


   Last Admin: 11/30/19 09:11 Dose:  1 tab


Oxybutynin Chloride (Ditropan)  5 mg PO DAILY [facility]


   Last Admin: 11/30/19 09:12 Dose:  5 mg


Pantoprazole Sodium (Protonix)  40 mg PO DAILY [facility]


   Last Admin: 11/30/19 09:12 Dose:  40 mg


Sodium Chloride (Flush - Normal Saline)  10 ml IVF Q12HR [facility]


   Last Admin: 11/30/19 09:12 Dose:  10 ml


Sodium Chloride (Flush - Normal Saline)  10 ml IVF PRN PRN


   PRN Reason: Saline Flush








Vital Signs & Weight: 


 Vital Signs











  Temp Pulse Resp BP BP BP Pulse Ox


 


 11/30/19 16:00  97.5 F L  66  15   101/52 L   91 L


 


 11/30/19 15:53  97.5 F L  66  15   101/52 L   91 L


 


 11/30/19 14:59        96


 


 11/30/19 14:36   65  20     96


 


 11/30/19 11:30  98.1 F  70  18    118/58 L  92 L


 


 11/30/19 10:53     111/55 L   


 


 11/30/19 10:45   63  14     94 L


 


 11/30/19 10:33       111/55 L 


 


 11/30/19 10:30       115/56 L 


 


 11/30/19 07:59  97.8 F  65  20    119/57 L  93 L


 


 11/30/19 07:32   78  20     94 L








 











Weight                         202 lb

















- Physical Exam


General: alert & oriented x3


HEENT: mucus membranes moist


Neck: supple neck, midline trachea


Cardiac: regular rate and rhythm, no murmur


Lungs: normal breath sounds


Neuro: grossly intact


Abdomen: active bowel sounds


Extremities: no edema


Skin: clear


Musculoskeletal: no pain





- Labs


Result Diagrams: 


 11/30/19 06:04





 11/27/19 04:42


 Troponin/CKMB











Troponin I  0.207 ng/mL (< 0.028)  H  11/25/19  02:11    














- Telemetry


Sinus rhythms and dysrhythmias: sinus rhythm





- Assessment/Plan


Assessment/Plan: 





1. Bifascicular block


2. CAD s/p CABg several years back


3. ITP, platelets improving. 


4. NSTEMI





PLAN:


- Currently platelets still under 50,000 but he was at 60K yesterday. 


- Continue plan to do conservative therapy at this time.


- Clinically he is stable.

## 2019-12-01 LAB
FREE THYROXINE INDEX: 2 (ref 1.4–3.1)
T4 FREE SERPL-MCNC: 1.04 NG/DL (ref 0.7–1.48)
TSH SERPL DL<=0.005 MIU/L-ACNC: 1.4 UIU/ML (ref 0.35–4.94)

## 2019-12-01 RX ADMIN — Medication SCH ML: at 09:07

## 2019-12-01 RX ADMIN — Medication SCH ML: at 20:52

## 2019-12-01 RX ADMIN — THERA TABS SCH TAB: TAB at 09:05

## 2019-12-01 RX ADMIN — ASPIRIN SCH MG: 81 TABLET ORAL at 09:04

## 2019-12-01 RX ADMIN — ISOSORBIDE MONONITRATE SCH: 30 TABLET, EXTENDED RELEASE ORAL at 09:07

## 2019-12-01 RX ADMIN — ALOGLIPTIN SCH MG: 25 TABLET, FILM COATED ORAL at 09:04

## 2019-12-01 NOTE — ULT
THYROID ULTRASOUND:

 

Indications: Thyroid abnormality. 

 

FINDINGS: 

Both lobes are mildly heterogeneous. Both lobes are slightly prominent. The right lobe measures 4.0 x
 1.7 x 2.0 cm and the left lobe measures 4.3 x 1.6 x 1.3 cm. 

 

No evidence of circumscribed mass or nodule identified. 

 

IMPRESSION: 

Mildly enlarged and heterogeneous thyroid. 

 

POS: AGW

## 2019-12-01 NOTE — PDOC.CPN
- Subjective


Date: 12/01/19


Time: 16:28


Interval history: 





He is doing well. 





- Review of Systems


General: denies: fever/chills, weight/appetite/sleep changes, night sweats, 

fatigue


Respiratory: denies: cough, congestion, shortness of breath, exercise 

intolerance


Cardiovascular: denies: chest pain, palpitation, edema, paroxysmal nocturnal 

dyspnea, orthopnea


Gastrointestinal: denies: nausea, vomiting, diarrhea, constipation, abd pain, 

GI bleeding


Musculoskeletal: denies: pain, tenderness, stiffness, swelling, arthritis/

arthralgias


Neurological: denies: numbness, syncope, seizure, weakness





- Objective


Allergies/Adverse Reactions: 


 Allergies











Allergy/AdvReac Type Severity Reaction Status Date / Time


 


No Known Allergies Allergy   Verified 11/25/19 22:46











Visit Medications: 


 Current Medications





Albuterol/Ipratropium (Duoneb)  3 ml NEB Q4H PRN


   PRN Reason:  SOB


Albuterol/Ipratropium (Duoneb)  3 ml NEB QID-RT [facility]


   Last Admin: 12/01/19 14:40 Dose:  3 ml


Alogliptin Benzoate (Alogliptin)  25 mg PO DAILY [facility]


   Last Admin: 12/01/19 09:04 Dose:  25 mg


Amiodarone HCl (Cordarone)  400 mg PO BID [facility]


   Last Admin: 12/01/19 09:04 Dose:  400 mg


Aspirin (Ecotrin)  81 mg PO DAILY [facility]


   Last Admin: 12/01/19 09:04 Dose:  81 mg


Azithromycin (Zithromax)  250 mg PO 1600 [facility]


   Last Admin: 11/30/19 17:30 Dose:  250 mg


Carvedilol (Coreg)  6.25 mg PO BID-[facility]


   Last Admin: 12/01/19 09:04 Dose:  6.25 mg


Cefdinir (Omnicef)  300 mg PO BID [facility]


   Stop: 12/04/19 09:01


   Last Admin: 12/01/19 09:04 Dose:  300 mg


Dextrose/Water (Dextrose 50%)  25 gm IVP PRN PRN


   PRN Reason: HYPOGLYCEMIA PROTOCOL


Furosemide (Lasix)  20 mg PO QAM [facility]


   Last Admin: 12/01/19 09:05 Dose:  20 mg


Glucagon (Glucagon)  1 mg IM PRN PRN


   PRN Reason: HYPOGLYCEMIA PROTOCOL


Dextrose/Water (D5w)  1,000 mls @ 0 mls/hr IV INF PRN


   PRN Reason: HYPOGLYCEMIA PROTOCOL


Insulin Human Regular (Humulin R)  0 units SC .MILD SLIDING PRN; Protocol


   PRN Reason: MILD SLIDING SCALE


Insulin Human Regular (Humulin R)  0 units SC .BEDTIME SLIDING SC PRN; Protocol


   PRN Reason: BEDTIME SLIDING SCALE


Isosorbide Mononitrate (Imdur Er)  30 mg PO BID [facility]


   Last Admin: 12/01/19 09:07 Dose:  Not Given


Lisinopril (Zestril)  20 mg PO DAILY [facility]


   Last Admin: 12/01/19 09:05 Dose:  20 mg


Multivitamins (Theragran)  1 tab PO DAILY [facility]


   Last Admin: 12/01/19 09:05 Dose:  1 tab


Oxybutynin Chloride (Ditropan)  5 mg PO DAILY [facility]


   Last Admin: 12/01/19 09:05 Dose:  5 mg


Pantoprazole Sodium (Protonix)  40 mg PO DAILY [facility]


   Last Admin: 12/01/19 09:04 Dose:  40 mg


Sodium Chloride (Flush - Normal Saline)  10 ml IVF Q12HR [facility]


   Last Admin: 12/01/19 09:07 Dose:  10 ml


Sodium Chloride (Flush - Normal Saline)  10 ml IVF PRN PRN


   PRN Reason: Saline Flush








Vital Signs & Weight: 


 Vital Signs











  Temp Pulse Resp BP BP Pulse Ox


 


 12/01/19 15:51  97.7 F  70  16   115/56 L  93 L


 


 12/01/19 14:40   63  18    92 L


 


 12/01/19 11:31  97.5 F L  65  14  116/60   93 L


 


 12/01/19 10:38   69  20    93 L


 


 12/01/19 09:01  97.6 F   18    94 L


 


 12/01/19 08:23   62   139/63  


 


 12/01/19 08:00   58 L  18    96


 


 12/01/19 07:24       93 L








 











Weight                         202 lb 11.2 oz

















- Physical Exam


General: alert & oriented x3


HEENT: mucus membranes moist


Neck: supple neck


Cardiac: regular rate and rhythm, no murmur


Lungs: normal breath sounds


Neuro: grossly intact


Abdomen: active bowel sounds


Extremities: no edema


Skin: clear


Musculoskeletal: no pain





- Labs


Result Diagrams: 


 11/30/19 06:04





 11/27/19 04:42


 Troponin/CKMB











Troponin I  0.207 ng/mL (< 0.028)  H  11/25/19  02:11    














- Telemetry


Sinus rhythms and dysrhythmias: sinus rhythm





- Assessment/Plan


Assessment/Plan: 





1. Bifascicular block


2. CAD s/p CABg several years back


3. ITP, platelets improving. 


4. NSTEMI





PLAN:


- Platelets chronically low and currently stable. Should be able to do LHC as 

this is a chronic issue for him and platelets have stabilized. 


- Will set up for LHC in the morning with Dr. Merrill his primary 

cardiologist.

## 2019-12-02 LAB
ANION GAP SERPL CALC-SCNC: 8 MMOL/L (ref 10–20)
BASOPHILS # BLD AUTO: 0 THOU/UL (ref 0–0.2)
BASOPHILS NFR BLD AUTO: 0 % (ref 0–1)
BUN SERPL-MCNC: 10 MG/DL (ref 8.4–25.7)
CALCIUM SERPL-MCNC: 8.3 MG/DL (ref 7.8–10.44)
CHLORIDE SERPL-SCNC: 105 MMOL/L (ref 98–107)
CO2 SERPL-SCNC: 32 MMOL/L (ref 23–31)
CREAT CL PREDICTED SERPL C-G-VRATE: 118 ML/MIN (ref 70–130)
EOSINOPHIL # BLD AUTO: 0 THOU/UL (ref 0–0.7)
EOSINOPHIL NFR BLD AUTO: 1.6 % (ref 0–10)
GLUCOSE SERPL-MCNC: 101 MG/DL (ref 83–110)
HGB BLD-MCNC: 10 G/DL (ref 14–18)
LYMPHOCYTES # BLD: 0.7 THOU/UL (ref 1.2–3.4)
LYMPHOCYTES NFR BLD AUTO: 24.1 % (ref 21–51)
MCH RBC QN AUTO: 28.9 PG (ref 27–31)
MCV RBC AUTO: 89.5 FL (ref 78–98)
MONOCYTES # BLD AUTO: 0.3 THOU/UL (ref 0.11–0.59)
MONOCYTES NFR BLD AUTO: 10.5 % (ref 0–10)
NEUTROPHILS # BLD AUTO: 1.9 THOU/UL (ref 1.4–6.5)
NEUTROPHILS NFR BLD AUTO: 63.8 % (ref 42–75)
PLATELET # BLD AUTO: 61 THOU/UL (ref 130–400)
POTASSIUM SERPL-SCNC: 3.5 MMOL/L (ref 3.5–5.1)
RBC # BLD AUTO: 3.46 MILL/UL (ref 4.7–6.1)
SODIUM SERPL-SCNC: 141 MMOL/L (ref 136–145)
WBC # BLD AUTO: 2.9 THOU/UL (ref 4.8–10.8)

## 2019-12-02 RX ADMIN — Medication SCH ML: at 21:01

## 2019-12-02 RX ADMIN — THERA TABS SCH TAB: TAB at 08:08

## 2019-12-02 RX ADMIN — ALOGLIPTIN SCH MG: 25 TABLET, FILM COATED ORAL at 08:06

## 2019-12-02 RX ADMIN — Medication SCH ML: at 08:08

## 2019-12-02 RX ADMIN — ASPIRIN SCH MG: 81 TABLET ORAL at 08:06

## 2019-12-02 NOTE — PRG
DATE OF SERVICE:  12/02/2019



SUBJECTIVE:  Mr. Hsu is doing well.  No current complaints.  He recently

presented with pneumonia and fever.  His troponin was mildly elevated at 0.2. 



After going over Mr. Hsu' symptoms, he states he has had chest pain that lasts

seconds.  It is worse with deep breath and movement.  No other ameliorating,

exacerbating, or precipitating factors present. 



I did review his diagram from 2000.  He has a 4-vessel bypass with a saphenous vein

graft to the right coronary artery, saphenous vein graft to diagonal, saphenous vein

graft to an OM branch, and a LIMA to the LAD.  He appears to have diffuse

small-vessel disease present distal to the anastomosis of the LAD and the right

coronary artery and circumflex artery. 



OBJECTIVE:  VITAL SIGNS:  Blood pressure 138/63, pulse 61, temperature 97.7. 

LUNGS:  Clear to auscultation. 

HEART:  Regular rate and rhythm. 

ABDOMEN:  Soft, nontender, nondistended. 

EXTREMITIES:  No edema.



PERTINENT LABORATORY DATA:  Hemoglobin 10, platelet count 61 after 2 units of

platelet transfusion, previous platelet count of 31,000 to 46,000. 



IMPRESSION:  

1. Atypical chest pain.

2. Elevated troponin.

3. Pneumonia.

4. Coronary artery disease.

5. Status post bypass surgery.



RECOMMENDATIONS:  

1. Mr. Mtz symptoms are not felt to be typical for angina.  I do have

significant concern for proceeding with more aggressive approach including coronary

angiography, which would only be done on diagnostic purpose.  I have reservations

about proceeding with stent placement given limited dual anti-platelet treatment and

continued low platelet count. 

2. At this point, I would recommend the following:

    a. Conservative measure.

    b. Continue 81 mg aspirin.

    c. Continue carvedilol 6.25 b.i.d.

    d. Add Imdur 30 mg daily.

    e. Consider adding Ranexa.

    f. Otherwise, from my standpoint, I have no further recommendations.







Job ID:  [id number]

## 2019-12-03 VITALS — TEMPERATURE: 97.8 F | DIASTOLIC BLOOD PRESSURE: 55 MMHG | SYSTOLIC BLOOD PRESSURE: 101 MMHG

## 2019-12-03 LAB
BASOPHILS # BLD AUTO: 0 THOU/UL (ref 0–0.2)
BASOPHILS NFR BLD AUTO: 1.1 % (ref 0–1)
EOSINOPHIL # BLD AUTO: 0.1 THOU/UL (ref 0–0.7)
EOSINOPHIL NFR BLD AUTO: 2.2 % (ref 0–10)
HGB BLD-MCNC: 10.8 G/DL (ref 14–18)
LYMPHOCYTES # BLD: 0.6 THOU/UL (ref 1.2–3.4)
LYMPHOCYTES NFR BLD AUTO: 19.9 % (ref 21–51)
MCH RBC QN AUTO: 28.8 PG (ref 27–31)
MCV RBC AUTO: 89.9 FL (ref 78–98)
MONOCYTES # BLD AUTO: 0.3 THOU/UL (ref 0.11–0.59)
MONOCYTES NFR BLD AUTO: 8.2 % (ref 0–10)
NEUTROPHILS # BLD AUTO: 2.1 THOU/UL (ref 1.4–6.5)
NEUTROPHILS NFR BLD AUTO: 68.7 % (ref 42–75)
PLATELET # BLD AUTO: 53 THOU/UL (ref 130–400)
RBC # BLD AUTO: 3.74 MILL/UL (ref 4.7–6.1)
WBC # BLD AUTO: 3 THOU/UL (ref 4.8–10.8)

## 2019-12-03 RX ADMIN — THERA TABS SCH TAB: TAB at 08:57

## 2019-12-03 RX ADMIN — ALOGLIPTIN SCH MG: 25 TABLET, FILM COATED ORAL at 08:57

## 2019-12-03 RX ADMIN — Medication SCH ML: at 08:58

## 2019-12-03 RX ADMIN — ASPIRIN SCH MG: 81 TABLET ORAL at 08:58

## 2020-09-29 ENCOUNTER — HOSPITAL ENCOUNTER (INPATIENT)
Dept: HOSPITAL 92 - ERS | Age: 75
LOS: 8 days | Discharge: HOSPICE | DRG: 280 | End: 2020-10-07
Attending: FAMILY MEDICINE | Admitting: FAMILY MEDICINE
Payer: MEDICARE

## 2020-09-29 VITALS — BODY MASS INDEX: 28.5 KG/M2

## 2020-09-29 DIAGNOSIS — J96.01: ICD-10-CM

## 2020-09-29 DIAGNOSIS — K72.90: ICD-10-CM

## 2020-09-29 DIAGNOSIS — K21.9: ICD-10-CM

## 2020-09-29 DIAGNOSIS — N17.9: ICD-10-CM

## 2020-09-29 DIAGNOSIS — I13.0: Primary | ICD-10-CM

## 2020-09-29 DIAGNOSIS — I50.43: ICD-10-CM

## 2020-09-29 DIAGNOSIS — I48.91: ICD-10-CM

## 2020-09-29 DIAGNOSIS — K74.60: ICD-10-CM

## 2020-09-29 DIAGNOSIS — Z51.5: ICD-10-CM

## 2020-09-29 DIAGNOSIS — I42.8: ICD-10-CM

## 2020-09-29 DIAGNOSIS — I48.92: ICD-10-CM

## 2020-09-29 DIAGNOSIS — I21.4: ICD-10-CM

## 2020-09-29 DIAGNOSIS — I25.10: ICD-10-CM

## 2020-09-29 DIAGNOSIS — N18.9: ICD-10-CM

## 2020-09-29 DIAGNOSIS — E78.5: ICD-10-CM

## 2020-09-29 DIAGNOSIS — E85.9: ICD-10-CM

## 2020-09-29 DIAGNOSIS — Z79.82: ICD-10-CM

## 2020-09-29 DIAGNOSIS — N40.0: ICD-10-CM

## 2020-09-29 DIAGNOSIS — I95.89: ICD-10-CM

## 2020-09-29 DIAGNOSIS — Z79.84: ICD-10-CM

## 2020-09-29 DIAGNOSIS — Z85.6: ICD-10-CM

## 2020-09-29 DIAGNOSIS — N52.9: ICD-10-CM

## 2020-09-29 DIAGNOSIS — Z91.19: ICD-10-CM

## 2020-09-29 DIAGNOSIS — D69.3: ICD-10-CM

## 2020-09-29 DIAGNOSIS — G47.33: ICD-10-CM

## 2020-09-29 DIAGNOSIS — D73.1: ICD-10-CM

## 2020-09-29 DIAGNOSIS — Z95.1: ICD-10-CM

## 2020-09-29 DIAGNOSIS — E87.5: ICD-10-CM

## 2020-09-29 DIAGNOSIS — Z20.828: ICD-10-CM

## 2020-09-29 DIAGNOSIS — Z79.899: ICD-10-CM

## 2020-09-29 DIAGNOSIS — Z66: ICD-10-CM

## 2020-09-29 DIAGNOSIS — I45.2: ICD-10-CM

## 2020-09-29 DIAGNOSIS — E11.9: ICD-10-CM

## 2020-09-29 DIAGNOSIS — D64.9: ICD-10-CM

## 2020-09-29 LAB
ALBUMIN SERPL BCG-MCNC: 3.4 G/DL (ref 3.4–4.8)
ALP SERPL-CCNC: 93 U/L (ref 40–110)
ALT SERPL W P-5'-P-CCNC: 20 U/L (ref 8–55)
ANALYZER IN CARDIO: (no result)
ANION GAP SERPL CALC-SCNC: 20 MMOL/L (ref 10–20)
AST SERPL-CCNC: 34 U/L (ref 5–34)
BASE EXCESS STD BLDA CALC-SCNC: -7.6 MEQ/L
BASOPHILS # BLD AUTO: 0.1 THOU/UL (ref 0–0.2)
BASOPHILS NFR BLD AUTO: 0.5 % (ref 0–1)
BILIRUB SERPL-MCNC: 1.4 MG/DL (ref 0.2–1.2)
BUN SERPL-MCNC: 54 MG/DL (ref 8.4–25.7)
CA-I BLDA-SCNC: 1.19 MMOL/L (ref 1.12–1.3)
CALCIUM SERPL-MCNC: 8.9 MG/DL (ref 7.8–10.44)
CHLORIDE SERPL-SCNC: 107 MMOL/L (ref 98–107)
CK MB SERPL-MCNC: 10.5 NG/ML (ref 0–6.6)
CK SERPL-CCNC: 83 U/L (ref 30–200)
CO2 SERPL-SCNC: 16 MMOL/L (ref 23–31)
CREAT CL PREDICTED SERPL C-G-VRATE: 0 ML/MIN (ref 70–130)
EOSINOPHIL # BLD AUTO: 0.1 THOU/UL (ref 0–0.7)
EOSINOPHIL NFR BLD AUTO: 0.5 % (ref 0–10)
GLOBULIN SER CALC-MCNC: 3.3 G/DL (ref 2.4–3.5)
GLUCOSE SERPL-MCNC: 132 MG/DL (ref 83–110)
HCO3 BLDA-SCNC: 16.2 MEQ/L (ref 22–28)
HCT VFR BLDA CALC: 26 % (ref 42–52)
HGB BLD-MCNC: 9.4 G/DL (ref 14–18)
HGB BLDA-MCNC: 8.8 G/DL (ref 14–18)
LIPASE SERPL-CCNC: 19 U/L (ref 8–78)
LYMPHOCYTES # BLD: 1.6 THOU/UL (ref 1.2–3.4)
LYMPHOCYTES NFR BLD AUTO: 14.6 % (ref 21–51)
MCH RBC QN AUTO: 30.5 PG (ref 27–31)
MCV RBC AUTO: 96.8 FL (ref 78–98)
MONOCYTES # BLD AUTO: 0.9 THOU/UL (ref 0.11–0.59)
MONOCYTES NFR BLD AUTO: 8.6 % (ref 0–10)
NEUTROPHILS # BLD AUTO: 8.1 THOU/UL (ref 1.4–6.5)
NEUTROPHILS NFR BLD AUTO: 75.8 % (ref 42–75)
PCO2 BLDA: 27 MMHG (ref 35–45)
PH BLDA: 7.4 [PH] (ref 7.35–7.45)
PLATELET # BLD AUTO: 101 THOU/UL (ref 130–400)
PO2 BLDA: 87.4 MMHG (ref 70–?)
POTASSIUM BLD-SCNC: 6.24 MMOL/L (ref 3.7–5.3)
POTASSIUM SERPL-SCNC: 6.5 MMOL/L (ref 3.5–5.1)
RBC # BLD AUTO: 3.09 MILL/UL (ref 4.7–6.1)
SODIUM SERPL-SCNC: 136 MMOL/L (ref 136–145)
SPECIMEN DRAWN FROM PATIENT: (no result)
TROPONIN I SERPL DL<=0.01 NG/ML-MCNC: 1.94 NG/ML (ref ?–0.03)
WBC # BLD AUTO: 10.6 THOU/UL (ref 4.8–10.8)

## 2020-09-29 PROCEDURE — 93975 VASCULAR STUDY: CPT

## 2020-09-29 PROCEDURE — 80048 BASIC METABOLIC PNL TOTAL CA: CPT

## 2020-09-29 PROCEDURE — 96375 TX/PRO/DX INJ NEW DRUG ADDON: CPT

## 2020-09-29 PROCEDURE — P9047 ALBUMIN (HUMAN), 25%, 50ML: HCPCS

## 2020-09-29 PROCEDURE — 87635 SARS-COV-2 COVID-19 AMP PRB: CPT

## 2020-09-29 PROCEDURE — 93798 PHYS/QHP OP CAR RHAB W/ECG: CPT

## 2020-09-29 PROCEDURE — 96372 THER/PROPH/DIAG INJ SC/IM: CPT

## 2020-09-29 PROCEDURE — 76856 US EXAM PELVIC COMPLETE: CPT

## 2020-09-29 PROCEDURE — U0003 INFECTIOUS AGENT DETECTION BY NUCLEIC ACID (DNA OR RNA); SEVERE ACUTE RESPIRATORY SYNDROME CORONAVIRUS 2 (SARS-COV-2) (CORONAVIRUS DISEASE [COVID-19]), AMPLIFIED PROBE TECHNIQUE, MAKING USE OF HIGH THROUGHPUT TECHNOLOGIES AS DESCRIBED BY CMS-2020-01-R: HCPCS

## 2020-09-29 PROCEDURE — 02HV33Z INSERTION OF INFUSION DEVICE INTO SUPERIOR VENA CAVA, PERCUTANEOUS APPROACH: ICD-10-PCS | Performed by: EMERGENCY MEDICINE

## 2020-09-29 PROCEDURE — 86850 RBC ANTIBODY SCREEN: CPT

## 2020-09-29 PROCEDURE — 36430 TRANSFUSION BLD/BLD COMPNT: CPT

## 2020-09-29 PROCEDURE — 36415 COLL VENOUS BLD VENIPUNCTURE: CPT

## 2020-09-29 PROCEDURE — 74177 CT ABD & PELVIS W/CONTRAST: CPT

## 2020-09-29 PROCEDURE — 84443 ASSAY THYROID STIM HORMONE: CPT

## 2020-09-29 PROCEDURE — 96368 THER/DIAG CONCURRENT INF: CPT

## 2020-09-29 PROCEDURE — 96366 THER/PROPH/DIAG IV INF ADDON: CPT

## 2020-09-29 PROCEDURE — 83605 ASSAY OF LACTIC ACID: CPT

## 2020-09-29 PROCEDURE — 93010 ELECTROCARDIOGRAM REPORT: CPT

## 2020-09-29 PROCEDURE — 93005 ELECTROCARDIOGRAM TRACING: CPT

## 2020-09-29 PROCEDURE — 85379 FIBRIN DEGRADATION QUANT: CPT

## 2020-09-29 PROCEDURE — 87040 BLOOD CULTURE FOR BACTERIA: CPT

## 2020-09-29 PROCEDURE — P9016 RBC LEUKOCYTES REDUCED: HCPCS

## 2020-09-29 PROCEDURE — 84484 ASSAY OF TROPONIN QUANT: CPT

## 2020-09-29 PROCEDURE — 71045 X-RAY EXAM CHEST 1 VIEW: CPT

## 2020-09-29 PROCEDURE — 86900 BLOOD TYPING SEROLOGIC ABO: CPT

## 2020-09-29 PROCEDURE — 80061 LIPID PANEL: CPT

## 2020-09-29 PROCEDURE — 83550 IRON BINDING TEST: CPT

## 2020-09-29 PROCEDURE — 80053 COMPREHEN METABOLIC PANEL: CPT

## 2020-09-29 PROCEDURE — 83690 ASSAY OF LIPASE: CPT

## 2020-09-29 PROCEDURE — 71275 CT ANGIOGRAPHY CHEST: CPT

## 2020-09-29 PROCEDURE — 99292 CRITICAL CARE ADDL 30 MIN: CPT

## 2020-09-29 PROCEDURE — 82550 ASSAY OF CK (CPK): CPT

## 2020-09-29 PROCEDURE — 86901 BLOOD TYPING SEROLOGIC RH(D): CPT

## 2020-09-29 PROCEDURE — 36416 COLLJ CAPILLARY BLOOD SPEC: CPT

## 2020-09-29 PROCEDURE — 85046 RETICYTE/HGB CONCENTRATE: CPT

## 2020-09-29 PROCEDURE — 83880 ASSAY OF NATRIURETIC PEPTIDE: CPT

## 2020-09-29 PROCEDURE — 83540 ASSAY OF IRON: CPT

## 2020-09-29 PROCEDURE — 96365 THER/PROPH/DIAG IV INF INIT: CPT

## 2020-09-29 PROCEDURE — 82553 CREATINE MB FRACTION: CPT

## 2020-09-29 PROCEDURE — P9035 PLATELET PHERES LEUKOREDUCED: HCPCS

## 2020-09-29 PROCEDURE — 3E033XZ INTRODUCTION OF VASOPRESSOR INTO PERIPHERAL VEIN, PERCUTANEOUS APPROACH: ICD-10-PCS | Performed by: EMERGENCY MEDICINE

## 2020-09-29 PROCEDURE — 94640 AIRWAY INHALATION TREATMENT: CPT

## 2020-09-29 PROCEDURE — 71250 CT THORAX DX C-: CPT

## 2020-09-29 PROCEDURE — 96361 HYDRATE IV INFUSION ADD-ON: CPT

## 2020-09-29 PROCEDURE — 80400 ACTH STIMULATION PANEL: CPT

## 2020-09-29 PROCEDURE — 82728 ASSAY OF FERRITIN: CPT

## 2020-09-29 PROCEDURE — 82533 TOTAL CORTISOL: CPT

## 2020-09-29 PROCEDURE — 82805 BLOOD GASES W/O2 SATURATION: CPT

## 2020-09-29 PROCEDURE — 83735 ASSAY OF MAGNESIUM: CPT

## 2020-09-29 PROCEDURE — 83036 HEMOGLOBIN GLYCOSYLATED A1C: CPT

## 2020-09-29 PROCEDURE — 93306 TTE W/DOPPLER COMPLETE: CPT

## 2020-09-29 PROCEDURE — 36556 INSERT NON-TUNNEL CV CATH: CPT

## 2020-09-29 PROCEDURE — 85025 COMPLETE CBC W/AUTO DIFF WBC: CPT

## 2020-09-29 NOTE — RAD
ONE VIEW CHEST:

9/29/20

 

COMPARISON: 

11/27/19.

 

HISTORY: 

Hypotension.

 

FINDINGS: 

There are sternotomy wires and cardiomegaly. Pulmonary vessels are prominent. There are chronic lung 
parenchymal changes, without mass  or consolidation. No pleural effusion or pneumothorax. No acute os
seous abnormalities. Cervical fusion hardware is once again demonstrated. 

 

IMPRESSION: 

No acute cardiopulmonary process. Chronic lung parenchymal changes. 

 

POS: PPP

## 2020-09-29 NOTE — CT
CT ANGIOGRAM OF THE CHEST:

9/29/20

 

HISTORY: 

Elevated D-dimer. Hypotension. 

 

COMPARISON: 

11/24/19.

 

TECHNIQUE:  

CT angiogram of the chest is performed in the axial plane. Three dimensional reformatted images are s
ubmitted for interpretation. 

 

FINDINGS: 

Lower neck and axilla do not demonstrate any masses or lymphadenopathy. 

 

No mediastinal mass or hematoma. There are enlarged prevascular and paratracheal lymph nodes. Represe
ntative enlarged right paratracheal lymph node measures 2.0 x 1.2 cm. No significant hilar lymphadeno
janet. 

 

Normal heart size. No significant pericardial fluid. There are coronary artery calcifications. Limite
d evaluation of the aorta due to poor contrast bolus. There is atherosclerosis. 

 

Subdiaphragmatic structures demonstrate contracted gallbladder with gallstone. There is nodularity of
 the liver suggesting cirrhosis. There is perihepatic and perisplenic fluid. Mild dilatation of the p
ortal vein and there is evidence of splenic and gastroesophageal varices. There is splenomegaly. 

 

There is diffuse edema in the soft tissues suggesting anasarca. 

 

Trachea and central bronchi are patent. Septal thickening in the lung parenchyma, similar to the prev
ious examination. Small cavitary focus in the upper lobe, measures 0.6 cm. No consolidation. No pleur
al effusion or pneumothorax. 

 

There are no lytic or blastic lesions in the osseous structures. 

 

Adequate contrast opacification of the pulmonary arterial system to the level of segmental arteries. 
No filling defect to suggest thromboembolism. 

 

IMPRESSION: 

1.      No evidence of pulmonary artery embolism to the level of the segmental arteries. 

2.      Cirrhosis. Portal hypertension and varices. 

3.      Cardiomegaly. 

4.      Splenomegaly and cholelithiasis. 

 

POS: PPP

## 2020-09-30 LAB
ANION GAP SERPL CALC-SCNC: 18 MMOL/L (ref 10–20)
ANION GAP SERPL CALC-SCNC: 19 MMOL/L (ref 10–20)
BUN SERPL-MCNC: 53 MG/DL (ref 8.4–25.7)
BUN SERPL-MCNC: 53 MG/DL (ref 8.4–25.7)
CALCIUM SERPL-MCNC: 8.2 MG/DL (ref 7.8–10.44)
CALCIUM SERPL-MCNC: 8.4 MG/DL (ref 7.8–10.44)
CHD RISK SERPL-RTO: 4.1 (ref ?–4.5)
CHLORIDE SERPL-SCNC: 108 MMOL/L (ref 98–107)
CHLORIDE SERPL-SCNC: 110 MMOL/L (ref 98–107)
CHOLEST SERPL-MCNC: 119 MG/DL
CK MB SERPL-MCNC: 6.8 NG/ML (ref 0–6.6)
CO2 SERPL-SCNC: 14 MMOL/L (ref 23–31)
CO2 SERPL-SCNC: 15 MMOL/L (ref 23–31)
COMM CRITICAL RESULTS DOC: (no result)
CREAT CL PREDICTED SERPL C-G-VRATE: 44 ML/MIN (ref 70–130)
CREAT CL PREDICTED SERPL C-G-VRATE: 45 ML/MIN (ref 70–130)
GLUCOSE SERPL-MCNC: 128 MG/DL (ref 83–110)
GLUCOSE SERPL-MCNC: 138 MG/DL (ref 83–110)
HDLC SERPL-MCNC: 29 MG/DL
LDLC SERPL CALC-MCNC: 75 MG/DL
POTASSIUM SERPL-SCNC: 6 MMOL/L (ref 3.5–5.1)
POTASSIUM SERPL-SCNC: 6.4 MMOL/L (ref 3.5–5.1)
SODIUM SERPL-SCNC: 135 MMOL/L (ref 136–145)
SODIUM SERPL-SCNC: 137 MMOL/L (ref 136–145)
TRIGL SERPL-MCNC: 75 MG/DL (ref ?–150)
TROPONIN I SERPL DL<=0.01 NG/ML-MCNC: 1.77 NG/ML (ref ?–0.03)
TROPONIN I SERPL DL<=0.01 NG/ML-MCNC: 1.84 NG/ML (ref ?–0.03)

## 2020-09-30 RX ADMIN — ALBUTEROL SULFATE SCH MG: 2.5 SOLUTION RESPIRATORY (INHALATION) at 14:32

## 2020-09-30 RX ADMIN — ALOGLIPTIN SCH MG: 6.25 TABLET, FILM COATED ORAL at 08:11

## 2020-09-30 RX ADMIN — INSULIN HUMAN PRN UNITS: 100 INJECTION, SOLUTION PARENTERAL at 16:23

## 2020-09-30 RX ADMIN — ASPIRIN SCH MG: 81 TABLET ORAL at 08:11

## 2020-09-30 RX ADMIN — ALBUTEROL SULFATE SCH MG: 2.5 SOLUTION RESPIRATORY (INHALATION) at 19:10

## 2020-09-30 RX ADMIN — Medication SCH MLS: at 20:09

## 2020-09-30 RX ADMIN — INSULIN HUMAN PRN UNITS: 100 INJECTION, SOLUTION PARENTERAL at 11:24

## 2020-09-30 RX ADMIN — ALBUTEROL SULFATE SCH MG: 2.5 SOLUTION RESPIRATORY (INHALATION) at 11:00

## 2020-09-30 RX ADMIN — ALBUTEROL SULFATE SCH MG: 2.5 SOLUTION RESPIRATORY (INHALATION) at 21:44

## 2020-09-30 RX ADMIN — Medication SCH MLS: at 13:05

## 2020-09-30 NOTE — CON
DATE OF CONSULTATION:  09/30/2020



CONSULTING PHYSICIAN:  Dr. Randall



REASON FOR CONSULTATION:  ICU management.



HISTORY OF PRESENT ILLNESS:  The patient is a 74-year-old male, who was found to

have blood pressure lower than normal.  Last night, was brought to the ER.  His

systolic was measuring in the 60s and 70s.  Normally, it looks like it runs in the

80s and 90s per a notebook that the patient carries with him.  Surprisingly, he was

not having any associated symptoms such as chest pain, nausea, vomiting, diarrhea,

cough, or congestion.  He was initially placed on dobutamine that has been turned

off and he is now on a Levophed drip. 



PAST MEDICAL HISTORY:  

1. Looks like previous echoes have actually showed normal ejection fraction, but he

does have a history of bifascicular block. 

2. Non-ST-segment elevation MI.

3. ITP.

4. Gastroesophageal reflux.

5. Hyperlipidemia.

6. Coronary artery disease.

7. Benign prostatic hypertrophy.

8. Erectile dysfunction.

9. BRIDGER, for which he refused to wear CPAP.

10. Diverticulosis.



PAST SURGICAL HISTORY:  

1. Coronary artery bypass grafting surgery in 1991.

2. Cervical stenosis repair.

3. Colonoscopy.



SOCIAL HISTORY:  Drinks socially.  Does not smoke.  He is .  Works at a gas

station. 



ALLERGIES:  NONE.



MEDICATIONS:  These were reviewed.  See summary section of chart.



REVIEW OF SYSTEMS:  Otherwise negative.



PHYSICAL EXAMINATION:

VITAL SIGNS:  Temperature 98.1, pulse 85, blood pressure 86/59 on Levophed 10

mcg/minute, and O2 saturation between 91% and 94%. 

GENERAL:  He is awake and alert, no distress. 

HEENT:  Pupils are reactive.  Sclerae are anicteric.  Oropharynx clear. 

NECK:  No adenopathy or JVD.  No bruits. 

LUNGS:  Clear to auscultation.  No wheezing or rhonchi. 

CARDIOVASCULAR:  S1 and S2 regular with third heart sound. 

ABDOMEN:  Soft and nontender to palpation.  Old surgical scar, midline. 

EXTREMITIES:  No clubbing, cyanosis, or edema.



LABORATORY DATA:  White blood cell count 10.6, hematocrit 29.9, and platelet count

101.  D-dimer 5.7.  PH 7.4, pCO2 of 27, PO2 of 87, initially 100% non-rebreather.

Sodium 135, potassium 6.4, chloride 108, CO2 of 14, anion gap 13, BUN 53, creatinine

1.7, and glucose 138.  Cortisol level 27.  TSH 1.4.  X-ray showed chronic

interstitial change without evidence of mass, effusion, or infiltrate. 



ASSESSMENT:  

1. Hypotension-etiology not clear, but may be secondary to his heart situation along

with current antihypertensives. 

2. Hyperkalemia-etiology unclear, but may be reflective of ACE inhibitor use.

3. No evidence of sepsis, current workup plan with hold ACE inhibitor.

4. Wean off Levophed as tolerated.

5. Cardiology consultation.

6. We will be happy to follow with you while he is in the ICU.







Job ID:  [id number]

## 2020-09-30 NOTE — CON
DATE OF CONSULTATION:  



REASON FOR CONSULTATION:  Hypotension.



HISTORY OF PRESENT ILLNESS:  Mr. Hsu is a very pleasant 74-year-old gentleman,

whom I have seen and evaluated in the past.  He was last seen 2 weeks ago in the

office.  His main complaint was shortness of breath and lower extremity edema.  His

blood pressure was marginal at 100 systolic.  His echo did suggest LVEF 45% to 50%

with grade 3 diastolic dysfunction.  His EKG showed a right bundle-branch block with

appropriate amplitude and voltage. 



He recently called the office complaining of a blood pressure in the 60s to 70s.  It

was recommended he proceed to the emergency room.  He states he had minimal

symptoms, but was concerned about low blood pressure. 



During my interview today, he again states he feels fairly good without complaints.

He is currently on low-dose Levophed. 



PAST MEDICAL HISTORY:  CAD, status post bypass surgery; thrombocytopenia; atrial

flutter; CABG x5 in 1991; pulmonary hypertension; cirrhosis; cardiomyopathy;

previous hypotension, diabetes mellitus; acid reflux; chronic anemia; advanced

diastolic dysfunction. 



HOME MEDICATIONS:  Include:

1. Lasix.

2. Potassium.

3. Metformin.

4. Carvedilol.

5. Oxybutynin.

6. Omeprazole.

7. Benazepril.

8. Pioglitazone.

9. Aspirin.

10. Multivitamin.

11. Ranolazine.

12. Saw palmetto.

13. Niacin.



SOCIAL HISTORY:  No current tobacco or alcohol use.



REVIEW OF SYSTEMS:  Ten-point review of systems is reviewed and as above, otherwise

negative. 



PHYSICAL EXAMINATION:

GENERAL:  Patient is a pleasant 74-year-old gentleman who is in no acute distress.

The patient appears their stated age. 

VITAL SIGNS:  Blood pressure 102/59, pulse 83, respirations 20. 

NEUROLOGIC:  The patient is alert and oriented x3 with no focal neurologic deficits. 

HEENT:  Sclerae without icterus.  Mouth has moist mucous membranes with normal

pallor. 

NECK:  No JVD.  Carotid upstroke brisk.  No bruits bilaterally. 

LUNGS:  Clear to auscultation with unlabored respirations. 

BACK:  No scoliosis or kyphosis. 

CARDIAC:  Regular rate and rhythm with normal S1 and S2.  No S3 or S4 noted.  No

significant rubs, murmurs, thrills, or gallops noted throughout the precordium.  PMI

is not displaced.  There is no parasternal heave. 

ABDOMEN:  Soft, nontender, nondistended.  No peritoneal signs present.  No

hepatosplenomegaly.  No abnormal striae. 

EXTREMITIES:  2+ femoral and 2+ dorsalis pedis pulses.  No cyanosis, clubbing, or

edema. 

SKIN:  No gross abnormalities.



PERTINENT LABORATORY DATA:  Include hemoglobin 9.4, hematocrit 29.9, and platelet

count of 101. 



EKG shows normal sinus rhythm with right bundle-branch block, nonspecific ST-T wave

changes. 



Peak troponin 1.7, CK-MB of 10.5.



IMPRESSION:  

1. Hypotension.

2. Coronary artery disease.

3. Elevated troponin.

4. Advanced diastolic dysfunction.



RECOMMENDATIONS:  I did review Mr. Hsu's echo once again.  He does not appear to

have significant LV wall thickness.  There is no echo brightness of the LV that

would suggest amyloidosis.  His CT scan did suggest cirrhosis.  We will recommend

iron studies to assess for hemochromatosis.  We will try and wean off Levophed.  We

will give him IV fluids x500 mL to see if his blood pressure improves.  At this

point, we would agree with stopping antihypertensive medications.  We would stop

Lasix, carvedilol in addition to benazepril.  Elevated troponin likely secondary to

demand ischemia.  We will continue to treat medically given no acute ST-T wave

changes and no symptoms of angina. 







Job ID:  [id number]

## 2020-09-30 NOTE — HP
CHIEF COMPLAINT ON ADMISSION:  Hypotension and exacerbation of CHF along with

elevated cardiac enzymes. 



HISTORY OF PRESENT ILLNESS:  The patient is a 74-year-old male with a long 
history

of CHF, who was noted by his caretaker on the evening of admission to have a

systolic blood pressure in the 60s, at which point, she told him she thought he

needed to come to the emergency room.  He finally agreed 2 to 3 hours later and 
did

in fact arrive in the ER with a systolic in the 70s, at which point, workup was

begun showing several abnormalities in his evaluation.  He denied any chest pain
or

shortness of breath.  He has not had any diaphoresis, nausea, vomiting, or any 
other

unusual discomfort.  Blood sugars have been running in the 140s.  However, he 
has a

renal insufficiency with a BUN of 54 and creatinine 1.9.  He has hyperkalemia 
with a

potassium of 6.5.  He had a D-dimer come back elevated at 5.7, but this after CT

scan proved to be a false positive.  He has arrived with a lactic acid at 4.7 
and a

CK-MB of 10.5 and a troponin of 2.2.  Dr. Randall was contacted by the ER 
physician to

place him in ICU such that a dobutamine drip can be utilized to keep a systolic

above 90, thereby allowing effective diuresis with furosemide. 



PAST MEDICAL HISTORY:  As mentioned previously, has been hospitalized numerous 
times

for CHF.  The last was in November of 2019 when he actually had a non-STEMI and

arrived in congestive heart failure.  Other past medical conditions include his

being a type 2 diabetic, GERD, dyslipidemia, and coronary artery disease.  He 
also

has BPH, ITP, erectile dysfunction, obstructive sleep apnea, for which he 
refuses to

wear a CPAP.  The aforementioned non-STEMI, carotid occlusion, and systolic

congestive heart failure.  He has diverticulosis. 



PAST SURGICAL HISTORY:  His past surgeries include cardiac bypass in 1991 and

cervical stenosis repair by Dr. Staley in 1988 and colonoscopy in 2011. 



PAST PSYCHIATRIC HISTORY:  Negative.



SOCIAL HISTORY:  He drinks socially.  Does not smoke.  He is  and 
currently

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 



ALLERGIES:  HE HAS NO KNOWN DRUG ALLERGIES.



MEDICATIONS ON ADMISSION:  Include;

1. Onglyza 5 mg daily.

2. Metformin 500 mg daily.

3. Saw palmetto.

4. Ranexa 500 mg b.i.d.

5. Actos 30 mg daily.

6. Ditropan 5 mg daily.

7. Omeprazole 20 mg at bedtime.

8. Niacin 100 mg b.i.d.

9. Daily multiple vitamin.

10. He recently been discontinued on Imdur and benazepril due to hypotension.

11. He had recently been discontinued on glimepiride due to hemoglobin A1c of 
less

than 5. 

12. He still takes aspirin 81 mg daily and amiodarone 400 mg daily.



REVIEW OF SYSTEMS:  At the time of admission; 

CONSTITUTIONAL:  He admits to fatigue and general malaise, but denies any pain,

fever, cough, or shortness of breath. 

HEENT:  Denies drainage or lesions in his head, ears, nose, or throat. 

CHEST:  Denies cough or dyspnea except with exertion, 

CARDIOVASCULAR:  Denies palpitations or chest pain. 

GI:  Denies nausea, vomiting, or diarrhea. 

:  Denies dysuria or blood in urine or stool. 

MUSCULOSKELETAL:  He has severe lower extremity weakness and admits to general

muscle weakness of sore as a muscle weakness and lower extremity edema. 

SKIN:  No new rashes or lesions. 

NEUROLOGIC:  Denies headaches, blurred vision, or trouble with mentation. 

PSYCHIATRIC:  At this time, denies depression, but he is mildly anxious.



PHYSICAL EXAMINATION:

At the time of admission; 

VITAL SIGNS:  Blood pressure is 74/45, pulse 93, respirations 16, O2 saturation 
at

up to 97%. 

GENERAL:  This is an obese, alert, and cooperative  male in no acute

distress, resting comfortably on his cot. 

HEENT:  Normocephalic and atraumatic.  Pupils are equal, round, and reactive to

light.  Arcus senilis bilaterally.  Pupils at 2 to 3 mm bilaterally.  TMs, 
nares,

and pharynx are clear. 

NECK:  Supple.  Trachea midline. 

CHEST:  Clear to auscultation. 

HEART:  Irregular rate and irregular rhythm. 

ABDOMEN:  Soft and mildly distended, nontender with both splenomegaly and

hepatomegaly. 

:  Deferred. 

EXTREMITIES:  With 3+ edema from the knees down bilaterally. 

SKIN:  No acute rashes or lesions.  Normal turgor. 

NEUROLOGIC:  Cranial nerves are intact.  Mental status is clear.  Gait and

cerebellar function untested.  Sensory exam is grossly intact. 



LABORATORY DATA:  On admission, WBCs are 10.6, hemoglobin 9.4, hematocrit 29.9, 
and

platelets at 101.  Sodium at 136, potassium 6.5, chloride 107, CO2 of 16, BUN 
54,

creatinine 1.9, and glucose at 132.  D-dimer 5.76.  ABG; pH is 7.4, pO2 is 87.4,
and

pCO2 is 27.  Lactic acid initially elevated at 4.7, repeated 3 hours later, it 
is

down to 3.4.  Total bilirubin 1.4.  Liver functions otherwise unremarkable.  CK-
MB

at 10.5, troponin at 2.2, and BNP elevated at 3162.  CT chest significant for

cardiomegaly, cirrhosis, splenomegaly, and cholelithiasis.  No evidence of 
pulmonary

embolism.  Chest x-ray noteworthy for cardiomegaly. 



ASSESSMENT:  

1. Hypotension due to exacerbation of congestive heart failure.

2. Non-ST segment elevation myocardial infarction probably secondary to 
congestive

heart failure and demand ischemia

3. Noninsulin-dependent diabetes.

4. Hyperkalemia.



PLAN:  Plan will be ICU admission.  Pressors to raise systolic pressure, thus

allowing for diuresis with IV Lasix.  Cardiology consultation, Dr. Abraham is 
already

aware of the patient's presence and is already have been consulted by the ER

physician and serial re-evaluation. 





Job ID:  [id number]



MTDD

## 2020-09-30 NOTE — RAD
XR Chest 1 View Portable



History: Central line placement



Comparison: Radiograph same day



Findings: Left subclavian central venous catheter is in place with tip at the inferior SVC. Remainder
 of the findings are unchanged. No pneumothorax. Moderate peripheral interstitial fibrosis.



Impression: Uncomplicated left subclavian central venous catheter placement.



Reported By: Darryn Alvarado 

Electronically Signed:  9/30/2020 7:59 AM

## 2020-10-01 LAB
ANION GAP SERPL CALC-SCNC: 16 MMOL/L (ref 10–20)
BUN SERPL-MCNC: 46 MG/DL (ref 8.4–25.7)
CALCIUM SERPL-MCNC: 8.1 MG/DL (ref 7.8–10.44)
CHLORIDE SERPL-SCNC: 111 MMOL/L (ref 98–107)
CO2 SERPL-SCNC: 16 MMOL/L (ref 23–31)
CREAT CL PREDICTED SERPL C-G-VRATE: 53 ML/MIN (ref 70–130)
GLUCOSE SERPL-MCNC: 156 MG/DL (ref 83–110)
POTASSIUM SERPL-SCNC: 5.5 MMOL/L (ref 3.5–5.1)
SODIUM SERPL-SCNC: 137 MMOL/L (ref 136–145)
TROPONIN I SERPL DL<=0.01 NG/ML-MCNC: 1.92 NG/ML (ref ?–0.03)

## 2020-10-01 RX ADMIN — INSULIN HUMAN PRN UNITS: 100 INJECTION, SOLUTION PARENTERAL at 17:38

## 2020-10-01 RX ADMIN — ALBUTEROL SULFATE SCH MG: 2.5 SOLUTION RESPIRATORY (INHALATION) at 18:12

## 2020-10-01 RX ADMIN — ALBUTEROL SULFATE SCH MG: 2.5 SOLUTION RESPIRATORY (INHALATION) at 21:49

## 2020-10-01 RX ADMIN — ALBUTEROL SULFATE SCH MG: 2.5 SOLUTION RESPIRATORY (INHALATION) at 07:51

## 2020-10-01 RX ADMIN — ALOGLIPTIN SCH MG: 6.25 TABLET, FILM COATED ORAL at 12:11

## 2020-10-01 RX ADMIN — ASPIRIN SCH MG: 81 TABLET ORAL at 08:42

## 2020-10-01 RX ADMIN — ASPIRIN SCH MG: 81 TABLET ORAL at 08:48

## 2020-10-01 RX ADMIN — ALBUTEROL SULFATE SCH MG: 2.5 SOLUTION RESPIRATORY (INHALATION) at 10:37

## 2020-10-01 RX ADMIN — Medication SCH MLS: at 10:53

## 2020-10-01 RX ADMIN — INSULIN HUMAN PRN UNITS: 100 INJECTION, SOLUTION PARENTERAL at 06:18

## 2020-10-01 RX ADMIN — ALBUTEROL SULFATE SCH MG: 2.5 SOLUTION RESPIRATORY (INHALATION) at 13:48

## 2020-10-01 RX ADMIN — ASPIRIN SCH MG: 81 TABLET ORAL at 08:41

## 2020-10-01 RX ADMIN — ONDANSETRON PRN MG: 2 INJECTION INTRAMUSCULAR; INTRAVENOUS at 11:09

## 2020-10-01 RX ADMIN — Medication SCH MLS: at 20:49

## 2020-10-01 NOTE — RAD
SINGLE VIEW OF THE CHEST:

 

COMPARISON: 

9/29/2020.

 

HISTORY: 

Increasing dyspnea.

 

FINDINGS: 

A single view of the chest shows an enlarged but stable cardiomediastinal silhouette.  The patient is
 status post CABG.  The central venous catheter is unchanged in position.  Diffuse increased intersti
tial markings are present.  Atelectasis is seen in both lung bases.  

 

IMPRESSION: 

Stable exam.

 

POS: EAA

## 2020-10-01 NOTE — PRG
DATE OF SERVICE:  10/01/2020



SUBJECTIVE:  Mr. Hsu has had progressive decline in his O2 sats overnight.  He is

more dyspneic than yesterday.  I noted that Cardiology had been giving him IV fluids

200 mL/hour.  __________, the nurse turned that off during the night.  The patient

has been placed on oxygen by face mask today. 



OBJECTIVE:  VITAL SIGNS:  His temperature is 97.8, pulse 90, blood pressure 99/72,

O2 saturation 93%.  Total intake 5435, output 1115. 

HEENT:  Unremarkable. 

NECK:  No adenopathy or JVD. 

LUNGS:  Inspiratory crackles on both bases. 

CARDIAC:  S1, S2.  Regular. 

ABDOMEN:  Soft, nontender to palpation. 

EXTREMITIES:  No clubbing, cyanosis, edema.



LABORATORY DATA:  Sodium 137, potassium 5.5, chloride 111, CO2 of 16, BUN 46,

creatinine 1.4, glucose 156.  White blood count 10.6, hematocrit 29.9, and platelet

count 101. 



IMAGING:  Chest x-ray is pending.



ASSESSMENT:  

1. Fluid overload secondary to diastolic congestive heart failure.

2. Severe left ventricular hypertrophy suspicious for amyloidosis.

3. Hyperkalemia, which is mild and improved after holding his angiotensin-converting

enzyme inhibitor. 



PLAN:  I think we should go ahead and stop his IV fluids and give him a dose of

Lasix.  I will check a chest x-ray.  He needs to have his labs monitored daily for

several days.  The patient needs to remain in the ICU while he is on the Levophed

drip.  Overall, his prognosis remains poor secondary to his cardiac issues. 





Job ID:  [id number]

## 2020-10-01 NOTE — PRG
DATE OF SERVICE:  10/01/2020



SUBJECTIVE:  Mr. Hsu developed increased shortness of breath overnight.  He had

been receiving 200 mL of IV fluids.  He also got 500 mL bolus of fluids due to

hypotension yesterday.  He appears to be fluid overloaded.  He continues to be on

Levophed.  He is on a low dose.  He continues to not complain of chest pain or

pressure.  His main complaint is not being able to eat. 



OBJECTIVE:  GENERAL:  Patient is a pleasant male, who is in no acute distress.  The

patient appears their stated age. 

VITAL SIGNS:  Blood pressure 89/77, pulse 108, respirations 20. 

NEUROLOGIC:  The patient is alert and oriented x3 with no focal neurologic deficits. 

HEENT:  Sclerae without icterus.  Mouth has moist mucous membranes with normal

pallor. 

NECK:  No JVD.  Carotid upstroke brisk.  No bruits bilaterally. 

LUNGS:  Crackle noted bilaterally. 

BACK:  No scoliosis or kyphosis. 

CARDIAC:  Regular rate and rhythm with normal S1 and S2.  No S3 or S4 noted.  No

significant rubs, murmurs, thrills, or gallops noted throughout the precordium.  PMI

is not displaced.  There is no parasternal heave. 

ABDOMEN:  Soft, nontender, nondistended.  No peritoneal signs present.  No

hepatosplenomegaly.  No abnormal striae. 

EXTREMITIES:  2+ femoral and 2+ dorsalis pedis pulses.  No cyanosis, clubbing, or

edema. 

SKIN:  No gross abnormalities.



PERTINENT LABORATORY DATA:  Hemoglobin 9.4, hematocrit 29.9.



IMPRESSION:  

1. Persistent hypotension.

2. CAD.

3. Status post bypass surgery.

4. Advanced diastolic dysfunction.

5. Renal insufficiency.

6. Cirrhosis.



RECOMMENDATIONS:  Current primary etiology to hypotension is unknown.  His last

LVEF, I did review in the office.  The LVEF was estimated at 45% to 50%.  He did

have grade 3 to 4 diastolic dysfunction.  His EKG did not suggest low voltage.  His

LV wall thickness did not appear thick.  He does appear to have diastolic

dysfunction, and we will treat appropriately.  We will recommend repeating his echo

and obtaining a random cortisol level.  He has been given low-dose Lasix due to

fluid overload.  Again, we will recheck echo. 







Job ID:  [id number]

## 2020-10-02 LAB
ANION GAP SERPL CALC-SCNC: 12 MMOL/L (ref 10–20)
BASOPHILS # BLD AUTO: 0 THOU/UL (ref 0–0.2)
BASOPHILS NFR BLD AUTO: 0.4 % (ref 0–1)
BUN SERPL-MCNC: 50 MG/DL (ref 8.4–25.7)
CALCIUM SERPL-MCNC: 8.1 MG/DL (ref 7.8–10.44)
CHLORIDE SERPL-SCNC: 108 MMOL/L (ref 98–107)
CO2 SERPL-SCNC: 19 MMOL/L (ref 23–31)
CREAT CL PREDICTED SERPL C-G-VRATE: 55 ML/MIN (ref 70–130)
EOSINOPHIL # BLD AUTO: 0.1 THOU/UL (ref 0–0.7)
EOSINOPHIL NFR BLD AUTO: 0.9 % (ref 0–10)
GLUCOSE SERPL-MCNC: 177 MG/DL (ref 83–110)
HGB BLD-MCNC: 9 G/DL (ref 14–18)
LYMPHOCYTES # BLD: 1.5 THOU/UL (ref 1.2–3.4)
LYMPHOCYTES NFR BLD AUTO: 14.5 % (ref 21–51)
MCH RBC QN AUTO: 30.9 PG (ref 27–31)
MCV RBC AUTO: 96.6 FL (ref 78–98)
MONOCYTES # BLD AUTO: 1.2 THOU/UL (ref 0.11–0.59)
MONOCYTES NFR BLD AUTO: 11 % (ref 0–10)
NEUTROPHILS # BLD AUTO: 7.6 THOU/UL (ref 1.4–6.5)
NEUTROPHILS NFR BLD AUTO: 73.2 % (ref 42–75)
PLATELET # BLD AUTO: 126 THOU/UL (ref 130–400)
POTASSIUM SERPL-SCNC: 4.9 MMOL/L (ref 3.5–5.1)
RBC # BLD AUTO: 2.91 MILL/UL (ref 4.7–6.1)
SODIUM SERPL-SCNC: 134 MMOL/L (ref 136–145)
WBC # BLD AUTO: 10.4 THOU/UL (ref 4.8–10.8)

## 2020-10-02 RX ADMIN — ALBUTEROL SULFATE SCH MG: 2.5 SOLUTION RESPIRATORY (INHALATION) at 11:01

## 2020-10-02 RX ADMIN — INSULIN HUMAN PRN UNITS: 100 INJECTION, SOLUTION PARENTERAL at 11:54

## 2020-10-02 RX ADMIN — ALOGLIPTIN SCH MG: 6.25 TABLET, FILM COATED ORAL at 08:58

## 2020-10-02 RX ADMIN — INSULIN HUMAN PRN UNITS: 100 INJECTION, SOLUTION PARENTERAL at 16:54

## 2020-10-02 RX ADMIN — Medication SCH MLS: at 07:41

## 2020-10-02 RX ADMIN — ALBUTEROL SULFATE SCH MG: 2.5 SOLUTION RESPIRATORY (INHALATION) at 18:27

## 2020-10-02 RX ADMIN — ALBUTEROL SULFATE SCH MG: 2.5 SOLUTION RESPIRATORY (INHALATION) at 14:35

## 2020-10-02 RX ADMIN — DOBUTAMINE HYDROCHLORIDE SCH MLS: 200 INJECTION INTRAVENOUS at 11:52

## 2020-10-02 RX ADMIN — ASPIRIN SCH MG: 81 TABLET ORAL at 08:50

## 2020-10-02 RX ADMIN — ALBUTEROL SULFATE SCH MG: 2.5 SOLUTION RESPIRATORY (INHALATION) at 06:56

## 2020-10-02 RX ADMIN — ALBUTEROL SULFATE SCH MG: 2.5 SOLUTION RESPIRATORY (INHALATION) at 21:49

## 2020-10-02 NOTE — CON
DATE OF CONSULTATION:  



SUBJECTIVE:  Mr. Hsu continues to not have any current symptoms.  His blood

pressure remains low after trying to wean off the Levophed.  He is on low-dose

Levophed currently.  IV fluids have been discontinued.  He did diurese some.  He

continues with lower extremity edema, although states it has improved. 



After reviewing his labs, his BNP has been markedly elevated.  His previous random

cortisol level also was within normal limits.  His blood cultures have been

negative. 



OBJECTIVE:  VITAL SIGNS:  Blood pressure 104/70, pulse 100, and respirations 20. 

LUNGS:  Crackles noted bilaterally. 

HEART:  Regular rate and rhythm.  Tachycardic. 

ABDOMEN:  Soft, nontender, and nondistended. 

EXTREMITIES:  2+ pitting edema.



PERTINENT LABORATORY DATA:  Hemoglobin 9.0, hematocrit 28.1.



IMPRESSION:  

1. Acute on chronic diastolic heart failure.

2. Elevated BNP.

3. Hypotension.

4. Coronary artery disease.

5. Status post bypass surgery.



RECOMMENDATIONS:  It appears renal insufficiency has been ruled out.  His BNP is

markedly elevated.  His last echo performed a month ago did show LVEF 45% to 50%.

He did appear to also have advanced diastolic dysfunction. 



His current situation, he likely is either developed acute systolic heart failure or

is all related to acute diastolic heart failure.  I would like to change his

Levophed to dobutamine for better inotropic support.  We will review his echo.  May

also consider milrinone. 



ADDENDUM:  Mr. Hsu continues to be hypotensive.  After reviewing his echo, his

LVEF was markedly diminished.  This was felt to be a new finding. 



He has required dopamine in addition to Levophed. 



I discussed options in full detail with Mr. Hsu as well as his significant other,

Nighat.  I discussed transferring to [facility].  I did have an accepting

physician.  This would be a transfer for more advanced heart failure care in

addition to potential LVAD.  After discussing options, Mr. Hsu was not interested

in proceeding with [facility].  He does understand that there are some limitations on

what we can offer at our facility.  At this point, he is happy with proceeding with

the continued medical therapy.  He understands this may fail. 



I have consulted with Dr. Sebastian Velasquez.  We will continue with current treatment.  We

will try and increase the blood pressure by increasing Levophed for better pressor

support. 



I also discussed the DNR status with Mr. Hsu.  Mr. Hsu is a DNR.  He states he

has a DNR on file.  Nighat was present during the discussion.  No cardioversion, no

intubation.  We will continue to treat aggressively outside of CPR measures. 







Job ID:  [id number]

## 2020-10-02 NOTE — PRG
DATE OF SERVICE:  10/02/2020



SUBJECTIVE:  He looks better today compared to yesterday.  He has been on high-flow

oxygen overnight.  His O2 saturation was 100% when I arrived.  His probe on his ear

does not work very well. 



OBJECTIVE:  VITAL SIGNS:  His last temperature is 97.8, pulse 107, blood pressure

104/70, O2 saturation 100%.  A 24-hour intake 660, output 1115.  His weight is 192

pounds, up 10 pounds from admission. 

HEENT:  Unremarkable. 

NECK:  No adenopathy or JVD. 

LUNGS:  He has a few crackles in the bases. 

CARDIAC:  S1 and S2.  Regular. 

ABDOMEN:  Soft. 

EXTREMITIES:  He has 4+ edema from his knees downward.



LABORATORY DATA:  Sodium 134, potassium 4.9, chloride 108, CO2 of 19, BUN 50,

creatinine 1.3, glucose 177.  White blood cell count 10.4, hematocrit 28.1, and

platelet count 126. 



ASSESSMENT:  

1. Hypotension requiring Levophed.

2. Acute hypoxic respiratory failure.

3. Fluid overload.



PLAN:  

1. Needs continued diuresis.

2. Wean the nasal cannula as tolerated.

3. Wean off vasopressors as tolerated.







Job ID:  [id number]

## 2020-10-03 LAB
ANION GAP SERPL CALC-SCNC: 13 MMOL/L (ref 10–20)
BASOPHILS # BLD AUTO: 0 THOU/UL (ref 0–0.2)
BASOPHILS # BLD AUTO: 0 THOU/UL (ref 0–0.2)
BASOPHILS NFR BLD AUTO: 0.3 % (ref 0–1)
BASOPHILS NFR BLD AUTO: 0.4 % (ref 0–1)
BUN SERPL-MCNC: 52 MG/DL (ref 8.4–25.7)
CALCIUM SERPL-MCNC: 8 MG/DL (ref 7.8–10.44)
CHLORIDE SERPL-SCNC: 106 MMOL/L (ref 98–107)
CO2 SERPL-SCNC: 18 MMOL/L (ref 23–31)
CREAT CL PREDICTED SERPL C-G-VRATE: 60 ML/MIN (ref 70–130)
EOSINOPHIL # BLD AUTO: 0 THOU/UL (ref 0–0.7)
EOSINOPHIL # BLD AUTO: 0.1 THOU/UL (ref 0–0.7)
EOSINOPHIL NFR BLD AUTO: 0.9 % (ref 0–10)
EOSINOPHIL NFR BLD AUTO: 1.5 % (ref 0–10)
GLUCOSE SERPL-MCNC: 175 MG/DL (ref 83–110)
HGB BLD-MCNC: 7.9 G/DL (ref 14–18)
HGB BLD-MCNC: 8.1 G/DL (ref 14–18)
IRON SERPL-MCNC: 13 UG/DL (ref 65–175)
LYMPHOCYTES # BLD: 0.7 THOU/UL (ref 1.2–3.4)
LYMPHOCYTES # BLD: 0.7 THOU/UL (ref 1.2–3.4)
LYMPHOCYTES NFR BLD AUTO: 12.3 % (ref 21–51)
LYMPHOCYTES NFR BLD AUTO: 13.9 % (ref 21–51)
MCH RBC QN AUTO: 30.6 PG (ref 27–31)
MCH RBC QN AUTO: 31 PG (ref 27–31)
MCV RBC AUTO: 96.5 FL (ref 78–98)
MCV RBC AUTO: 98.3 FL (ref 78–98)
MONOCYTES # BLD AUTO: 0.5 THOU/UL (ref 0.11–0.59)
MONOCYTES # BLD AUTO: 0.5 THOU/UL (ref 0.11–0.59)
MONOCYTES NFR BLD AUTO: 10 % (ref 0–10)
MONOCYTES NFR BLD AUTO: 9.5 % (ref 0–10)
NEUTROPHILS # BLD AUTO: 3.6 THOU/UL (ref 1.4–6.5)
NEUTROPHILS # BLD AUTO: 4.1 THOU/UL (ref 1.4–6.5)
NEUTROPHILS NFR BLD AUTO: 75.5 % (ref 42–75)
NEUTROPHILS NFR BLD AUTO: 75.8 % (ref 42–75)
PLATELET # BLD AUTO: 47 THOU/UL (ref 130–400)
PLATELET # BLD AUTO: 49 THOU/UL (ref 130–400)
POTASSIUM SERPL-SCNC: 4.9 MMOL/L (ref 3.5–5.1)
RBC # BLD AUTO: 2.55 MILL/UL (ref 4.7–6.1)
RBC # BLD AUTO: 2.63 MILL/UL (ref 4.7–6.1)
RETICS/RBC NFR: 4.5 % (ref 0.5–1.5)
SODIUM SERPL-SCNC: 132 MMOL/L (ref 136–145)
UIBC SERPL-MCNC: 301 MCG/DL (ref 261–462)
WBC # BLD AUTO: 4.8 THOU/UL (ref 4.8–10.8)
WBC # BLD AUTO: 5.4 THOU/UL (ref 4.8–10.8)

## 2020-10-03 PROCEDURE — 30233N1 TRANSFUSION OF NONAUTOLOGOUS RED BLOOD CELLS INTO PERIPHERAL VEIN, PERCUTANEOUS APPROACH: ICD-10-PCS | Performed by: FAMILY MEDICINE

## 2020-10-03 RX ADMIN — INSULIN HUMAN PRN UNITS: 100 INJECTION, SOLUTION PARENTERAL at 21:35

## 2020-10-03 RX ADMIN — DOBUTAMINE HYDROCHLORIDE SCH MLS: 200 INJECTION INTRAVENOUS at 08:57

## 2020-10-03 RX ADMIN — ALBUTEROL SULFATE SCH MG: 2.5 SOLUTION RESPIRATORY (INHALATION) at 18:32

## 2020-10-03 RX ADMIN — Medication SCH MLS: at 10:40

## 2020-10-03 RX ADMIN — ALBUTEROL SULFATE SCH MG: 2.5 SOLUTION RESPIRATORY (INHALATION) at 22:26

## 2020-10-03 RX ADMIN — ONDANSETRON PRN MG: 2 INJECTION INTRAMUSCULAR; INTRAVENOUS at 04:46

## 2020-10-03 RX ADMIN — INSULIN HUMAN PRN UNITS: 100 INJECTION, SOLUTION PARENTERAL at 11:57

## 2020-10-03 RX ADMIN — ASPIRIN SCH MG: 81 TABLET ORAL at 09:09

## 2020-10-03 RX ADMIN — ALBUTEROL SULFATE SCH MG: 2.5 SOLUTION RESPIRATORY (INHALATION) at 14:41

## 2020-10-03 RX ADMIN — VASOPRESSIN SCH MLS: 20 INJECTION INTRAVENOUS at 20:11

## 2020-10-03 RX ADMIN — ALOGLIPTIN SCH MG: 6.25 TABLET, FILM COATED ORAL at 09:01

## 2020-10-03 RX ADMIN — ALBUTEROL SULFATE SCH MG: 2.5 SOLUTION RESPIRATORY (INHALATION) at 10:42

## 2020-10-03 RX ADMIN — INSULIN HUMAN PRN UNITS: 100 INJECTION, SOLUTION PARENTERAL at 15:59

## 2020-10-03 RX ADMIN — ALBUTEROL SULFATE SCH MG: 2.5 SOLUTION RESPIRATORY (INHALATION) at 07:07

## 2020-10-03 NOTE — PRG
DATE OF SERVICE:  10/03/2020



SUBJECTIVE:  Mr. Hsu's blood pressure appears to be more stable.  He continues to

be on Dobutrex in addition to Levophed.  He is seen sitting up and eating.  He is

off high-flow oxygen.  Unfortunately, his platelet count has decreased to 47,000.

He has had issues with thrombocytopenia in the past. 



OBJECTIVE:  VITAL SIGNS: Blood pressure 83/63, pulse 100, and respirations 20. 

LUNGS:  Crackles noted bilaterally. 

HEART:  Regular rate and rhythm.  Slightly tachycardic. 

ABDOMEN:  Soft, nontender, and nondistended. 

EXTREMITIES:  2+ pitting edema.



PERTINENT LABORATORY DATA:  Hemoglobin 7.9, hematocrit 25.1, and platelet count 47,

down from 126. 



IMPRESSION:  

1. Acute-on-chronic systolic heart failure.

2. Likely cardiogenic shock.

3. Coronary artery disease.

4. Status post bypass surgery.

5. Anemia.



RECOMMENDATIONS:  I did speak with Dr. Velasquez yesterday about proceeding with medical

therapy.  It was recommended to transfuse with a hemoglobin less than 9.  His

hemoglobin has dropped to 7.9.  We will keep a close eye on bleeding.  His platelet

count also decreased, which is felt to be a new finding for this admission.  He has

had issues with thrombocytopenia in the past. 



Continue Dobutrex in addition to Levophed.  Blood pressure appears to be too low to

add Milrinone.  I agree with Dr. Spence on adding low-dose Lasix, although

difficult with low blood pressure.  His blood pressure may come up after

transfusion.  We would recommend guaiacing all stools. 



Current prognosis appears guarded.



ADDENDUM:  Mr. Hsu continues to be hypotensive.  I consulted with Dr. Sebastian Velasquez.

Digoxin IV has been given.  Heart rate has been stable.  It appears to be consistent

with atrial flutter. 



The option is to cardiovert, although may be difficult given persistent hypotension

likely from cardiogenic shock.  Anticoagulation therapy will also be difficult due

to persistent thrombocytopenia in addition to a low iron level suggesting chronic

bleeding. 



At this point, we will continue to closely monitor and make adjustments if needed.







Job ID:  [id number]

## 2020-10-03 NOTE — PRG
DATE OF SERVICE:  10/03/2020



SUBJECTIVE:  He is sitting up in bed on the phone.  Does not appear to be in any

distress.  He is on Levophed and dobutamine. 



OBJECTIVE:  VITAL SIGNS:  His blood pressure is 83/63, pulse 109, respiratory rate

19, O2 saturation 96%, temperature 97.7. 

HEENT:  Unremarkable. 

NECK:  No JVD. 

LUNGS:  Few crackles in the bases. 

CARDIAC:  S1, S2.  Tachycardic. 

ABDOMEN:  Soft. 

EXTREMITIES:  Trace edema.



LABORATORY DATA:  White blood cell count 4.8, hematocrit 25.1, and platelet count

47.  Sodium 132, potassium 4.9, chloride 106, CO2 of 18, BUN 52, creatinine 1.3,

glucose 175.  BNP 7841. 



ASSESSMENT:  

1. Hypotension secondary to acute systolic heart failure on top of chronic diastolic

heart failure. 

2. Acute hypoxic respiratory failure.

3. Fluid overload.

4. Developing thrombocytopenia.



PLAN:  

1. His Lovenox is being held secondary to thrombocytopenia.

2. He will continue the dobutamine and norepinephrine.  Dr. Velasquez has been consulted,

and I would assume some changes are going to be made in that regard. 

3. Would like to continue diuretics if at all possible.

4. Will need to remain in ICU while he is on the vasopressors and inotropes.





Job ID:  [id number]

## 2020-10-04 LAB
ANION GAP SERPL CALC-SCNC: 14 MMOL/L (ref 10–20)
BASOPHILS # BLD AUTO: 0 THOU/UL (ref 0–0.2)
BASOPHILS NFR BLD AUTO: 0 % (ref 0–1)
BUN SERPL-MCNC: 54 MG/DL (ref 8.4–25.7)
CALCIUM SERPL-MCNC: 7.9 MG/DL (ref 7.8–10.44)
CHLORIDE SERPL-SCNC: 103 MMOL/L (ref 98–107)
CO2 SERPL-SCNC: 17 MMOL/L (ref 23–31)
CREAT CL PREDICTED SERPL C-G-VRATE: 59 ML/MIN (ref 70–130)
EOSINOPHIL # BLD AUTO: 0 THOU/UL (ref 0–0.7)
EOSINOPHIL NFR BLD AUTO: 0.1 % (ref 0–10)
GLUCOSE SERPL-MCNC: 232 MG/DL (ref 83–110)
HGB BLD-MCNC: 9.4 G/DL (ref 14–18)
LYMPHOCYTES # BLD: 0.5 THOU/UL (ref 1.2–3.4)
LYMPHOCYTES NFR BLD AUTO: 9.2 % (ref 21–51)
MCH RBC QN AUTO: 30.9 PG (ref 27–31)
MCV RBC AUTO: 94.1 FL (ref 78–98)
MONOCYTES # BLD AUTO: 0.4 THOU/UL (ref 0.11–0.59)
MONOCYTES NFR BLD AUTO: 7.3 % (ref 0–10)
NEUTROPHILS # BLD AUTO: 4.1 THOU/UL (ref 1.4–6.5)
NEUTROPHILS NFR BLD AUTO: 83.3 % (ref 42–75)
PLATELET # BLD AUTO: 40 THOU/UL (ref 130–400)
POTASSIUM SERPL-SCNC: 5 MMOL/L (ref 3.5–5.1)
RBC # BLD AUTO: 3.03 MILL/UL (ref 4.7–6.1)
SODIUM SERPL-SCNC: 129 MMOL/L (ref 136–145)
WBC # BLD AUTO: 4.9 THOU/UL (ref 4.8–10.8)

## 2020-10-04 RX ADMIN — ALBUTEROL SULFATE SCH MG: 2.5 SOLUTION RESPIRATORY (INHALATION) at 07:47

## 2020-10-04 RX ADMIN — DOBUTAMINE HYDROCHLORIDE SCH MLS: 200 INJECTION INTRAVENOUS at 03:09

## 2020-10-04 RX ADMIN — ALBUTEROL SULFATE SCH MG: 2.5 SOLUTION RESPIRATORY (INHALATION) at 18:29

## 2020-10-04 RX ADMIN — INSULIN HUMAN PRN UNITS: 100 INJECTION, SOLUTION PARENTERAL at 17:47

## 2020-10-04 RX ADMIN — Medication SCH ML: at 21:08

## 2020-10-04 RX ADMIN — ALBUTEROL SULFATE SCH MG: 2.5 SOLUTION RESPIRATORY (INHALATION) at 14:52

## 2020-10-04 RX ADMIN — ALBUTEROL SULFATE SCH MG: 2.5 SOLUTION RESPIRATORY (INHALATION) at 10:27

## 2020-10-04 RX ADMIN — VASOPRESSIN SCH MLS: 20 INJECTION INTRAVENOUS at 03:07

## 2020-10-04 RX ADMIN — ALBUTEROL SULFATE SCH MG: 2.5 SOLUTION RESPIRATORY (INHALATION) at 22:07

## 2020-10-04 RX ADMIN — ALBUMIN HUMAN SCH GM: 250 SOLUTION INTRAVENOUS at 21:08

## 2020-10-04 RX ADMIN — INSULIN HUMAN PRN UNITS: 100 INJECTION, SOLUTION PARENTERAL at 21:16

## 2020-10-04 RX ADMIN — INSULIN HUMAN PRN UNITS: 100 INJECTION, SOLUTION PARENTERAL at 05:40

## 2020-10-04 RX ADMIN — INSULIN HUMAN PRN UNITS: 100 INJECTION, SOLUTION PARENTERAL at 12:00

## 2020-10-04 NOTE — ULT
PELVIC ULTRASOUND:

 

History: Pelvic pain.  GI Bleed. 

 

FINDINGS: 

Real-time images of the pelvis is limited due to bowel gas. The bladder is normal in position. There 
appears to be a small amount of fluid present within the pelvis. The pre-void volume of the bladder w
as 184 cc. 

 

IMPRESSION: 

Minimal ascites, otherwise unremarkable pelvic ultrasound.

 

POS: OFF

## 2020-10-04 NOTE — ULT
ABDOMINAL ULTRASOUND:

 

History: Abdominal pain, GI bleed. 

 

FINDINGS: 

Real-time imaging of the upper abdomen was performed. This shows a echogenic heterogeneous liver. The
re is a nodular cirrhotic contour to the liver. There is ascites present. The spleen is enlarged at 1
5.2 cm. 

 

Common duct is 4 mm. The gallbladder was never definitively visualized on this exam. Patient was not 
NPO for this study. Previous CT showed a gallstone within the gallbladder on 9-. 

 

The pancreas is obscured. 

 

Right and left kidneys are difficult to visualize due to bowel gas. They appear normal in size and no
t obstructed. The abdominal aorta is partially obscured. Only the proximal portion could be visualize
d. IVC region is also obscured. 

 

Technologist had difficulty definitely establishing flow within the main portal vein. The exam was te
chnically difficult due to size and body habitus and gas. 

 

IMPRESSION: 

1. Non-visualization of the gallbladder although a recent CT showed a gallstone within a contracted g
allbladder. The common duct is normal in caliber. 

2. Heterogeneous echogenicity to the liver with suggestion of nodular cirrhotic contour and splenomeg
jaylin and mild ascites. 

 

POS: OFF

## 2020-10-04 NOTE — PRG
DATE OF SERVICE:  10/04/2020



SERVICE:  [facility].



SUBJECTIVE:  Mr. Conrad Hsu had a very good afternoon and night.  At first,

he has atrial fibrillation as confirmed by ECG with a rapid rate response up to 
120

sometimes.  He was given IV digoxin 0.25 mg.  That converted to a regular rhythm

most likely atrial flutter.  With that, dobutamine was able to be titrated up

slightly to 6 mcg/kg/minute and systolic blood pressure went from 80s to 90s.  
Then,

he was transfused 2 units of blood.  Lasix 40 mg IV was given in between the 
unit

and vasopressin at 0.04 was also started because he has low diastolic pressure 
and

inadequate vascular response.  Amiodarone 200 mg twice a day was also started.  
He

was on amiodarone in the past.  The amiodarone also aim for better rate control 
perhaps

facilitating conversion back to sinus rhythm.  These maneuvers worked.  His 
rhythm converted

from atrial fibrillation looks like to atrial flutter to perhaps sinus rhythm.  
His systolic blood pressure

was able to be maintained at low 100.  Norepinephrine was able to be titrated 
off

and the dobutamine actually was titrated down to 4 mcg/kg/minute.  He also had a

urine output of greater than 1000 mL.  He said he felt good.  He was able to 
sleep.

He said this is the first time he is able to sleep in a while. 



REVIEW OF SYSTEMS:  GENERAL:  There are no fever or chills. 

HEENT:  There is no changes in vision, hearing, or swallowing. 

PULMONARY:  He does have some shortness of breath and cough, however, is not 
worse

and perhaps a little bit better. 

CARDIAC:  He does not complain of chest pain, palpitation, or syncope. 

GI:  There is no nausea, vomiting, or diarrhea. 

:  He said he is urinating a lot. 

MUSCULOSKELETAL:  He has chronic back pain, but he is not complaining of new 
pain

this morning. 

INTEGUMENT:  There is no new skin breakdown. 

NEUROLOGIC:  There are no new focal deficits or weaknesses.



CURRENT MEDICATIONS:  Include:

1. Albuterol every 4 hours.

2. Amiodarone 200 mg twice a day.

3. Digoxin 0.125 mg daily.

4. Dobutamine currently at 4 mcg/kg/minute.

5. Furosemide 40 mg IV once per day.

6. Jardiance 10 mg daily.

7. Norepinephrine, which had been titrated off already.

8. Protonix at 40 mg daily.

9. Vasopressin currently at 0.04 units/minute.

10. Alogliptin has been stopped.



PHYSICAL EXAMINATION:

Telemetry was reviewed.  It appears to be atrial flutter or sinus tachycardia.  
It

is a regular rhythm with heart rate between the high 90s and low 100s. 

VITAL SIGNS:  His blood pressure is 103/64.  However, his oxygen saturation is 
in

the low 90s. 

GENERAL:  He is alert, conversational, reclining comfortably in bed. 

HEENT:  Show EOMI.  Oropharynx is benign with moist mucosa. 

NECK:  JVP is high at least 13 cm. 

PULMONARY:  There is good air movement; however, there are bibasilar crackles in
the

lower one-third of the lung. 

CARDIAC:  Tachycardic with 2/6 holosystolic murmur at the left sternal border.

There is normal S1 and S2.  There is no discernible gallops or rubs. 

ABDOMEN:  Soft, nontender.  Positive bowel sounds.  However, he did have some

pitting edema at the thighs. 

EXTREMITIES:  Lower extremities, he has about 1.5 cm pitting edema from feet all
the

way to his knees, and also some pitting edema around his thighs. 



LABORATORY VALUES:  White cell count 4.9, hemoglobin 9.4, platelet has dropped 
down

to 40.  His chemistry shows sodium 129, potassium 5, chloride 103, bicarb 
reduced

down 17, and BUN 54 and creatinine 1.34. 



Cortrosyn stimulation test increased cortisols from baseline value of 15.7 up to

43.7 in 1 hour.  Thus, he had net increase of 28.  This is normal. 



ASSESSMENT:  74-year-old gentleman has acute-on-chronic heart failure with 
reduced

ejection fraction.  He has both systolic and diastolic dysfunction.  He also has
a

right ventricular failure leading to anasarca state.  He also has significant 
iron

deficiency anemia.  He also has thrombocytopenia.  He also has atrial 
fibrillation,

atrial flutter.  Conversion of atrial fibrillation down to more regular rhythms 
such

as atrial flutter at times sinus tachycardia seemed to have increased cardiac 
output.  Providing blood volume

also was able to increase his overall blood pressure.  Temporary supplementation
of

vasopressin allowed the titration off norepinephrine.  At this point, we will 
need

to increase diuresis and titrate off vasopressin.  Conversion to sinus rhythm 
would

be the best thing.  If at all possible if the patient is stable on dobutamine 
alone,

he can be converted over to milrinone for his right heart failure.  Please see 
the

following for today's recommendations. 



RECOMMENDATIONS:  

1. Please attempt to titrate off vasopressin today.

2. May increase dobutamine as needed with top limit of 7.5 mcg/kg/minute, but 
keep

the heart rate less than 120 and aim for a MAP greater than 65. 

3. Increase diuresis with increasing frequency of IV Lasix to 40 mg IV b.i.d.

4. Supplement with magnesium oxide 400 mg daily.

5. Consider cardioversion.

6. Consider consulting GI for iron deficiency anemia to look for potential 
bleeding.

7. Consider consulting Hematology Oncology about finding that the rationale for 
low

platelets and what will be the best way to reverse. 

8. Also abdominal ultrasound to look for cirrhosis and splenomegaly.

9. Please do followup ECG to document like his current rhythm likely to be 
atrial

flutter or sinus tachycardia. 



It has been a pleasure taking care of Mr. Hsu.  If you have any questions, 
please

give me a call. 



The ICU time is about 40 minutes.  This includes personally performing history 
and

physical, reviewing data, coordinating care with staff, modification of drips, 
and

direct patient interaction. 







Job ID:  [id number]



[facility]

## 2020-10-04 NOTE — CON
DATE OF CONSULTATION:  10/03/2020



SERVICE:  [facility]



REASON FOR CONSULTATION:  Management of acute heart failure.



HISTORY OF PRESENT ILLNESS:  Mr. Conrad Hsu, a 74-year-old gentleman with 
known

cardiac dysfunction, but near normal ejection fraction about 45%, history of

coronary artery disease, presented in shock.  Mr. Hsu claims at baseline 
about a

year ago, he could walk as far as he wants to, there is really no limitation.  
Then

about 6 months ago, his walking distance has decreased down to about one block. 
He

said his legs felt weak and heavy, and about that time, he developed leg edema 
too.

He needed to take furosemide for it.  Then in the last month, his walking 
distance

decreased down to about 100 feet.  He has increasing leg edema.  However, he was

still able to lie flat, could sleep without orthopnea and really without PND.  
At

times, he was noted to be weak and looking ill at work.  But then he still went 
to

work part-time in a gas station.  Then on 2020, at work, he looked

very poor.  Then his supervisor called his wife.  His wife came and took

him to the ER.  At the ER, he was found to be in a hypotensive state.  He was 
found

to be in shock.  CTA did not show any pulmonary embolus.  Blood cultures were 
also

drawn.  He was placed on combination of dobutamine and probably norepinephrine, 
and

admitted. Since admission, he persistently required norepinephrine to support 
blood

pressure.  Some attempts were made to convert norepinephrine to dobutamine but 
it

was unsuccessful. 



Mr. Hsu then provided Nighat [phone], his girlfriend who is very 
meticulous in documentation, for detailed information. Mr. Baumann had a 
significant loss in 2020.  His son  suddenly at 48 years old.  His 
son had a

seizure and fell down and broke his cranium.  He essentially had intracranial 
bleed.  

He is quite shaken up by that.  Since then, according to Nighat that the

patient had significant decline and has really not recovered since then.  There 
is

also a well documented list of blood pressures.  In , his 
systolic

blood pressure ranged between 98 to 115 and his systolic blood pressure remained

between high 90s and low 100s throughout July and most of August, then about 
 and beyond, his systolic blood pressures started drifting down to the 90s
and

sometimes dipping down into the high 70s and this drift continued.  Two weeks 
before

his admission, most of the blood pressures were in the 80.  On the date of

admission, systolic blood pressure was only 73 at home.  However, throughout 
this

time, the patient claimed to be fairly asymptomatic.  The symptoms consist of 
weak

legs and lower extremity swelling that persistently increased. 



There were a number of medication changes.  The patient does not believe that he
has

any arrhythmia.  There is some documentation that he had controlled atrial 
flutter.

At one time, he was on amiodarone.  He was switched to carvedilol perhaps.  He 
was

also on ACE inhibitor, benazepril, due to the low blood pressures it was 
eventually

stopped. 



PAST MEDICAL HISTORY:  

1. Coronary artery disease with coronary artery bypass of least four vessels 
back in

. 

2. Some history of atrial flutter.

3. Type 2 diabetes.

4. 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

5. History of anemia.

6. History of pulmonary hypertension.



SOCIAL HISTORY:  

He smoked for 22 years.  However, he stopped in .  

He denies alcohol use.  

He denies illicit drug use. 

He lives alone. 

He is retired from 40 years of .  He did serve in the .  He
gets his outpatient

medication from the [employer]. 



FAMILY HISTORY:  His father  of myocardial infarction at age 65.  His mother

 of sudden cardiac death at age 68.  It was witnessed where she walked in 
and

just fell over.  He had a son who  suddenly at age 48 from seizure.  He fell

down and broke his skull causing intracranial bleed.  This could be a triggering

event for potential Takotsubo cardiomyopathy. 



REVIEW OF SYSTEMS:  GENERAL:  There is no fever, chills, or productive cough. 

HEENT:  There is no change in vision, hearing, or swallowing. 

CARDIAC:  Please see HPI. 

PULMONARY:  He does not really complain much.  However, he did admit to 
progressive

shortness of breath especially with exertion. 

GI:  There is no nausea, vomiting, diarrhea, or blood in the stool. 

:  He is able to urinate well on his own. 

MUSCULOSKELETAL:  There are no complaints of back pain, joint pain or muscle 
pains

currently. 

INTEGUMENT:  There are no complaints of skin breakdown. 

NEUROLOGIC:  There are no complaints of new focal deficits or weaknesses.



ALLERGIES:  NO KNOWN DRUG ALLERGIES.



CURRENT MEDICATIONS:  Include

1. Albuterol nebs q.4 hours.

2. Alogliptin.

3. Dobutamine currently about 5 mcg/kg per minute.

4. Furosemide 40 mg IV daily.

5. Sliding scale insulin.

6. Norepinephrine currently at 3 mcg/minute.

7. Protonix at 40 mg daily. 



Telemetry was reviewed.  It shows a mixture of supraventricular tachycardias, at

times looks like atrial fibrillation, at times it looks like there is an atrial

flutter, at times there could be sinus rhythm. 



A EKG was done and reviewed.  It showed it is atrial fibrillation with normal 
axis.

The QRS is wide, close to 140 milliseconds. 



PHYSICAL EXAMINATION:

VITAL SIGNS:  Currently, his vitals are heart rate 102, blood pressure 89/48. 

GENERAL:  He is alert, conversational, sitting comfortably in bed.  He is 
actually

not too distressed at this point. 

HEENT:  EOMI.  Oropharynx is benign with moist mucosa. 

NECK:  His JVP is elevated at about 12 cm of positive hepatojugular reflux. 

PULMONARY:  There is good air movement.  However, he has definite strong 
crackles in

bilateral lower half of the lungs. 

CARDIAC:  Tachycardic, irregular rate, irregular rhythm, there is a 2/6 
holosystolic

murmur at the left sternal border.  There is 2/6 systolic murmur near the apex. 

ABDOMEN:  Mildly distended. 

EXTREMITIES:  His thighs have pitting edema.  His lower extremities to his knees

have significant amount of pitting edema.  You can push in about 2 cm from his 
feet

to above the knees and a little bit beyond. 



LABORATORY DATA:  Latest laboratory values are white cell count 5.4, hemoglobin 
8.1,

platelet count at 49.  His chemistry from this morning is sodium 132, potassium 
4.9,

chloride 106, bicarb 18, BUN 52, creatinine at 1.32 and his BNP value is 7841. 

. 

Iron study showed he has a low iron at 13 and a very low iron saturation at 4%.

Thus, he has significant iron deficiency anemia. 



Echocardiogram from 2020 was also reviewed.  

He has dilated right ventricle.  

Right ventricle is hypokinetic. It has moderate to severe depressed

contractility.  

He has tricuspid regurgitation.  Does show transpulmonary gradient

of 45.  So that would give his PA systolic pressure of roughly about 60 mmHg.  

His left ventricle is dilated at LVIDd of 6.5 cm.  

His left ventricular ejectionmfraction visually is estimated at 25%.  

He has probable grade 3 diastolic dysfunction.  

He has mitral regurgitation and also tricuspid regurgitation. 



ASSESSMENT:  74-year-old gentleman is suffering from multifactorial cause of

shock.  He has heart failure with reduced ejection fraction with combined 
systolic

and diastolic dysfunctions.  He likely to reside in American Heart Association 
stage

C and [place] Heart Association class 3B.  It is biventricular failure.  He has

both right ventricular dilation with dysfunction and also left ventricular 
dilation with

dysfunction.  His atrial arrhythmia does not help.  If we can get him back into

sinus rhythm, that will be greatly helpful.  He is also anemic.  This is due to 
iron

deficiency.  Transfusion of 2 units would help his blood pressure.  For long 
term,

iron supplementation will also be helpful.  Perhaps he will need a GI consult to

find a potential bleeding source.  It was said that he has been losing weight.  
He

could have a malignancy somewhere that is undocumented or undiscovered.  It is

unusual that his diastolic blood pressure is quite low, so he could be adrenal

insufficient, so this also needs to be tested.  Finally, he does have coronary

artery disease.  A graft coming to a close slowly over time can also cause this.

 When the patient's blood pressure is stable and has good platelet count, then 
this

issue can be addressed with coronary angiogram, but that would be dependent on 
all

the other factors.  Please see the following for my recommendations. 



RECOMMENDATIONS:  

1. Agree and please transfuse 2 units of blood.

2. Use digoxin to slow the rate down while preserving or promoting 
contractility.

Please give digoxin 0.25 mg IV one dose now and followed by 0.125 mg daily. 

3. Please start amiodarone at a low dose of 200 mg by mouth twice a day.

4. Please do Cortrosyn stimulation test.  This will be first measure baseline

cortisol value.  Then give 200 mcg of Cortrosyn, afterwards collect blood for

cortisol level at 30 minutes, 60 minutes afterwards.  Normal increase will be 
20.

If less than 20, then he would have adrenal insufficiency. 

5. Once the patient has sufficient blood pressure, please consider DC 
cardioversion

with both amiodarone and digoxin on board. 

6. Please give magnesium sulfate 2 g IV now.  This will help with the 
arrhythmia.

7. Please follow magnesium values daily.  We will need to correct this on a 
daily

basis. 

8. Depending on the patient, we will consider adding vasopressin 0.04 
units/minute.

This will allow titrating off norepinephrine.  Vasopressin will be less 
arrhythmogenic.

However, dobutamine should be kept on board because the patient has 
biventricular

failure.  The right ventricle really needs the dobutamine to augment function.

9. Agree with 40 mg IV Lasix daily.  He needs fluid off.  He is in pulmonary 
edema.  We

will just do this carefully.  

10. Please discontinue alogliptin because this is known to

cause heart failure as one of its side effects.  We will use p.r.n. insulin and

using insulin will be the safest inpatient ICU treatment for his diabetes.  

11. When the patient has a good blood pressure, retaining his normal renal 
function, then we can

start oral heart failure medications that can partially restore his heart.. 



It has been a pleasure taking care of Mr. Conrad Hsu.  If any questions, 
please

give me a call. 



The total visit time for the ICU is 80 minutes.  This includes personally 
performing

history and physical, visiting with family members and also getting data from 
his

girlfriend, personally reading echocardiogram and EKG, coordinating care, 
titrating

drips, and direct patient and family interaction. 







Job ID:  [id number]



[facility]

## 2020-10-04 NOTE — PRG
DATE OF SERVICE:  10/04/2020



SUBJECTIVE:  He is in fairly good spirits this morning.  He is experiencing some

nausea. 



OBJECTIVE:  VITAL SIGNS:  His temperature is 97.6, pulse 101, blood pressure 111/66,

O2 saturation 93%.  24-hour intake 1593, output 1215. 

HEENT:  Unremarkable.  He is still on high-flow oxygen at 38%. 

LUNGS:  Clear anteriorly. 

CARDIOVASCULAR:  S1 and S2, slightly tachycardic. 

ABDOMEN:  Soft and nontender. 

EXTREMITIES:  No clubbing, cyanosis, but he is edematous in his legs.



LABORATORY DATA:  Sodium 129, potassium 5, chloride 103, CO2 of 17, BUN 54,

creatinine 1.3, and glucose 232.  White blood cell count 4.9, hematocrit 28.5, and

platelet count 40. 



ASSESSMENT:  

1. Chronic systolic/diastolic heart failure.

2. Acute renal failure.

3. Developing hyperkalemia.

4. Thrombocytopenia.



PLAN:  

1. Heart failure being managed by Dr. Velasquez and Dr. Merrill.

2. His potassium level increasing is somewhat concerning.  I do not see any drugs

that he is on that would contribute to that. 

3. In terms of his thrombocytopenia, I also do not see any drugs that could be

contributing to that at this time.  The patient will remain in the ICU as long as he

is on vasopressin and dobutamine. 







Job ID:  [id number]

## 2020-10-04 NOTE — PRG
DATE OF SERVICE:  10/04/2020



SUBJECTIVE:  Mr. Hsu today is better.  His blood pressure appears to be more

stable.  He was weaned off Levophed and placed on vasopressin.  He continues to be

on Dobutrex.  He continues to have urine output.  He has no current symptoms.  His

hemoglobin now appears more stable at 9.4. 



OBJECTIVE:  VITAL SIGNS:  Blood pressure 111/66, pulse 104, and respirations 20. 

LUNGS:  Crackles noted bilaterally. 

HEART:  Regular rate and rhythm. 

ABDOMEN:  Soft, nontender, nondistended. 

EXTREMITIES:  2+ pitting edema.



PERTINENT LABORATORY DATA:  Hemoglobin as above.  Creatinine 1.34, which is stable.



IMPRESSION:  

1. Acute on chronic systolic heart failure.

2. Coronary artery disease.

3. Status post bypass surgery.

4. Anemia.

5. Renal insufficiency.



RECOMMENDATIONS:  We will continue to treat Mr. Hsu with aggressive medical

therapy.  Plan is to wean off vasopressin.  If able to wean off vasopressin, may

consider milrinone.  It would be difficult to diurese Mr. Hsu given hypotension.

He is in sinus rhythm and in no need of cardioversion.  I did contemplate adding

amiodarone to keep him in normal rhythm, but again have difficulty with hypotension. 







Job ID:  [id number]

## 2020-10-05 LAB
ALBUMIN SERPL BCG-MCNC: 3.4 G/DL (ref 3.4–4.8)
ALP SERPL-CCNC: 113 U/L (ref 40–110)
ALT SERPL W P-5'-P-CCNC: 283 U/L (ref 8–55)
ANION GAP SERPL CALC-SCNC: 14 MMOL/L (ref 10–20)
AST SERPL-CCNC: 179 U/L (ref 5–34)
BASOPHILS # BLD AUTO: 0 THOU/UL (ref 0–0.2)
BASOPHILS NFR BLD AUTO: 0 % (ref 0–1)
BILIRUB SERPL-MCNC: 2.8 MG/DL (ref 0.2–1.2)
BUN SERPL-MCNC: 55 MG/DL (ref 8.4–25.7)
CALCIUM SERPL-MCNC: 8.4 MG/DL (ref 7.8–10.44)
CHLORIDE SERPL-SCNC: 102 MMOL/L (ref 98–107)
CO2 SERPL-SCNC: 20 MMOL/L (ref 23–31)
CREAT CL PREDICTED SERPL C-G-VRATE: 60 ML/MIN (ref 70–130)
EOSINOPHIL # BLD AUTO: 0.1 THOU/UL (ref 0–0.7)
EOSINOPHIL NFR BLD AUTO: 1.6 % (ref 0–10)
GLOBULIN SER CALC-MCNC: 2.5 G/DL (ref 2.4–3.5)
GLUCOSE SERPL-MCNC: 166 MG/DL (ref 83–110)
HGB BLD-MCNC: 8.7 G/DL (ref 14–18)
LYMPHOCYTES # BLD: 0.6 THOU/UL (ref 1.2–3.4)
LYMPHOCYTES NFR BLD AUTO: 11.4 % (ref 21–51)
MAGNESIUM SERPL-MCNC: 2.2 MG/DL (ref 1.6–2.6)
MCH RBC QN AUTO: 30.7 PG (ref 27–31)
MCV RBC AUTO: 94.9 FL (ref 78–98)
MONOCYTES # BLD AUTO: 0.5 THOU/UL (ref 0.11–0.59)
MONOCYTES NFR BLD AUTO: 9.4 % (ref 0–10)
NEUTROPHILS # BLD AUTO: 3.7 THOU/UL (ref 1.4–6.5)
NEUTROPHILS NFR BLD AUTO: 77.6 % (ref 42–75)
PLATELET # BLD AUTO: 37 THOU/UL (ref 130–400)
POTASSIUM SERPL-SCNC: 4.9 MMOL/L (ref 3.5–5.1)
RBC # BLD AUTO: 2.84 MILL/UL (ref 4.7–6.1)
SODIUM SERPL-SCNC: 131 MMOL/L (ref 136–145)
WBC # BLD AUTO: 4.8 THOU/UL (ref 4.8–10.8)

## 2020-10-05 RX ADMIN — ALBUTEROL SULFATE SCH MG: 2.5 SOLUTION RESPIRATORY (INHALATION) at 18:31

## 2020-10-05 RX ADMIN — ALBUMIN HUMAN SCH GM: 250 SOLUTION INTRAVENOUS at 20:34

## 2020-10-05 RX ADMIN — ALBUMIN HUMAN SCH GM: 250 SOLUTION INTRAVENOUS at 08:05

## 2020-10-05 RX ADMIN — ALBUTEROL SULFATE SCH MG: 2.5 SOLUTION RESPIRATORY (INHALATION) at 14:13

## 2020-10-05 RX ADMIN — INSULIN HUMAN PRN UNIT: 100 INJECTION, SOLUTION PARENTERAL at 16:54

## 2020-10-05 RX ADMIN — Medication SCH ML: at 08:05

## 2020-10-05 RX ADMIN — Medication SCH ML: at 20:34

## 2020-10-05 RX ADMIN — ALBUTEROL SULFATE SCH: 2.5 SOLUTION RESPIRATORY (INHALATION) at 21:51

## 2020-10-05 RX ADMIN — INSULIN HUMAN PRN UNITS: 100 INJECTION, SOLUTION PARENTERAL at 11:41

## 2020-10-05 RX ADMIN — ALBUTEROL SULFATE SCH MG: 2.5 SOLUTION RESPIRATORY (INHALATION) at 05:12

## 2020-10-05 RX ADMIN — INSULIN HUMAN PRN UNITS: 100 INJECTION, SOLUTION PARENTERAL at 06:13

## 2020-10-05 RX ADMIN — ALBUTEROL SULFATE SCH MG: 2.5 SOLUTION RESPIRATORY (INHALATION) at 10:33

## 2020-10-05 NOTE — PRG
DATE OF SERVICE:  10/05/2020



SERVICE:  Advanced Heart Failure Cardiology Consult Service.



SUBJECTIVE:  Mr. Conrad Hsu actually had an excellent day.  He slept well.  
He

said it was the best sleep in many weeks.  Many changes took place throughout 
last

for 48 hour.  Importantly yesterday, he was switched over to vasopressin and

vasopressin was able to be titrated off.  He was able to maintain his mean 
arterial

blood pressure above 65 with dobutamine alone.  However, his blood transfusion 
did

not hold.  He is losing hemoglobin again despite net volume loss.  Overall, he

believes he feels better, but he is very apprehensive.  Today, he wants to 
explore

all the possible options.  We also held a conversation with him and his 
girlfriend,

likely wife. 



REVIEW OF SYSTEMS:  GENERAL:  There is no fever or chills. 

HEENT:  There are no changes in vision, hearing, or swallowing. 

PULMONARY:  He has less shortness of breath, but then he has productive cough.  
He

said he cough up with slight blood tinge in his sputum.  This likely to be heart

failure. 

CARDIAC:  He does not complain of chest pain or syncope or palpitation. 

GI:  He said that he has early satiety, a little bit of food making full 
quickly,

but this has been ongoing for over three months. 

:  He is able to urinate on his own.  He said that he does have some trouble 
with

the collection container. 

MUSCULOSKELETAL:  He is not complaining any back or muscle pains. 

INTEGUMENT:  There is no complaint of new skin breakdown. 

NEUROLOGIC:  There are no focal deficits or weaknesses or loss of sensation.



MEDICATIONS:  Currently include:

1. Albumin 25 g IV b.i.d.

2. Albuterol q.4 hours.

3. Amiodarone 200 mg b.i.d.

4. Digoxin 0.125 mg daily.

5. Dobutamine currently at 5 mcg/kg per minute, titrated up to 6 mcg/kg/minute 
to go

a better blood pressure without really affecting heart rate. 

6. Furosemide 40 mg IV b.i.d.

7. He is on regular insulin.

8. Magnesium oxide 400 mg daily.

9. Jardiance at 10 mg daily.

10. Protonix 40 mg daily.



PHYSICAL EXAMINATION:

Telemetry was reviewed.  It looks like his is most in sinus tachycardia, 
however, at

some point that it might be able to interpret as atrial flutter, but most likely

sinus tachycardia, occasional PVC.  Besides that, there are no concerning

arrhythmias. 

VITAL SIGNS:  His oxygen saturations is better today, ranged between low 80s to 
mid

90s when I saw him.  His heart rate was 103, blood pressure 96/55. 

GENERAL:  He is alert and conversational.  Less short of breath than yesterday.

Able to eat breakfast. 

HEENT:  EOMI.  Oropharynx benign with moist mucosa. 

NECK:  JVP is about 13 cm with positive hepatojugular reflux. 

PULMONARY:  He has good air movement on top; however, he has bibasilar crackles.

The level of bibasilar crackles is lower than yesterday, so he has some slight

improvement in air space. 

CARDIAC:  Tachycardic.  Normal S1, S2.  There is 2/6 holosystolic murmur at left

sternal border and also at the apex. 

ABDOMEN:  Mildly distended, soft, nontender.  Positive bowel sounds. 

EXTREMITIES:  Lower extremities, he has 1.5 cm pitting edema from the feet up to
his

knees.  He also has some pitting edema at his thighs and some slight pitting 
edema

on his lower abdomen. 



LABORATORY VALUES:  Show white cell count 4.1, hemoglobin 8.7, platelets of 37. 



His chemistry showed sodium 131.  There is a slight improvement.  Potassium 4.9,

chloride 102, and bicarbonate 20.  BUN is 55, creatinine is 1.37, and glucose 
166.

However, he has elevated liver enzymes.  Total bilirubin is 2.8, , ALT 
283.

This is likely due to passive congestion.  His 24-hour I's and O's 1152 in and 
1490

out, so he is slightly net negative yesterday. 



ASSESSMENT:  74-year-old  gentleman has acute on chronic heart failure

with reduced ejection fraction.  He has combined systolic and diastolic 
dysfunction.

 He has biventricular failure, has both RV dysfunction and LVEF only about 25%. 
It

is good that vasopressin was able to be titrated off; however, there was rising

liver enzymes and marginal blood pressure.  He is very precarious.  At this 
point,

he needs dobutamine at 6 mcg/kg/minute IV GTT.  He will be best served if 
another

center that can evaluate him for potential urgent LVAD implantation, or high 
risk

coronary angio with Impella back up, if needed, right ventricular biopsy to 
diagnose

potential myocarditis.  He also has very low platelets.  I suspect this is due 
to

splenic sequestration due to heart failure.  It will be good to have

Hematology/Oncology on board about this.  He has inadequate compensation from 
blood

transfusion.  He could be losing blood somewhere.  We need to have his 
hemoglobin

above 9 to have any chance to sustain recovery, not worsening his heart failure.

So, this also needs to be corrected.  Please see the following for my

recommendation. 



RECOMMENDATIONS:  

1. May titrate dobutamine up as needed to keep MAP above 65 mmHg, but keep heart

rate less than 120, and keep the maximum dose less than 10 mcg/kg/minute. 

2. Please transfuse one more unit of blood.

3. Do not give the second dose of furosemide until the 2nd unit of blood is

transfused. 

4. Please consider consulting Hematology/Oncology about thrombocytopenia.  If

steroids are needed that will be fine, it will help with his vascular tone too. 

5. A long discussion took place between the patient, his long-term girlfriend,

Nighat.  Left ventricular assist device was better described.  The patient had a

misconception of left ventricular assist device being external with him fixing 
to it

and unable to be mobile.  After gaining understanding that left ventricular 
assist

device is internally implanted and he can walk around with batteries, and he can
be

mobile with it and also LVAD survival of three to five years.  He was very much

pleased to hear that he wants to pursue this option if possible.  Possibilities 
of

[facility] and [facility] were brought up.  The patient had a 
good

experience of [facility] in the past.  He is choosing to be going to [facility].

Thus, I will make the calls to transfer the patient to [facility].  The patient 
does

have a good family support.  His family can take him in or someone can stay with
him

for 6 weeks.  So consequently, this could be a potential option if he is judged 
to

be LVAD acceptable. 

6. We will begin the process to ask for hospital transfer from [place] to [facility]. 



It has been a pleasure taking care of Mr. Hsu.  If you have any questions, 
please

give me a call. 



This ICU visit took 70 minute.  This includes personally performing history and

physical, reading telemetry, reading data, coordinating care, titration of 
drips,

and holding extensive patient and family member counseling. 







Job ID:  [id number]



MTDD

## 2020-10-05 NOTE — PDOC.PALCO
Palliative Care Consult





- Consult Details


Requesting Physician: Dr Randall


Reason for Consult: goals of care, advance directives assistance, assistance 

with communication prognosis/disease





- Pertinent HPI


74 year old male with known heart failure, combined. He lives in an independent 

home setting and has a woman in his life named Nighat who serves as his primary 

caregiver. He has a daughter and daughter-in-law (His son passes away this past 

year).  He has a primary caregiver Nighat who assists in all care.  He was found 

to have a systolic blood pressure in the 60's in the home setting and came to 

the emergency room for evaluation.  He was found to have hypotension, renal 

insufficiency, hyperkalemia, and was admitted to CCU for higher level of care. 

Discussion in relation to consideration for transfer to [facility] by Dr Velasquez. 








- Pertinent PMH





Acute on Chronic combined heart failure, diabetes, HDL, CAD, BPH, ITP, Sleep 

apnea/non compliant with Cpap.





- Social History


Smoking: no tobacco exposure


Alcohol Use: rarely


Drug Use History: none


Living Situation: independent





- Medications


MAR Reviewed: Yes





- Allergies


Allergies/Adverse Reactions: 


                                    Allergies











Allergy/AdvReac Type Severity Reaction Status Date / Time


 


No Known Allergies Allergy   Verified 19 22:46














- Subjective





Sitting at side of bed, O2, receiving blood products, continues on pressure 

support. Dentures not in/states they are loose fitting and that he has lost 50 

lbs in past year. 





- ROS


Constitutional: alert, weakness


ENT: alteration in dentition, other (Denies difficulity swallowing)


Respiratory: shortness of breath


Cardiology: other (Denies palpitations. Postiive for edema)


Genitourinary: other (Negative for hematuria, dysuria)


Musculoskeletal: arthritis/arthralgias


Neurological: other (numbness ot lower extremities more pronounced ot right)





- Objective


Vital Signs: 


                            Vital Signs - Most Recent











Temp Pulse Resp BP Pulse Ox


 


 98.2 F   94   20   93/54 L  94 L


 


 10/05/20 08:00  10/05/20 10:33  10/05/20 10:33  10/03/20 18:00  10/05/20 10:33











Palliative Performance Scale: 40





- Advance Directives


Medical Power of : Will readdress as his son was BAM and he is 

. 





- Physical Exam


Constitutional: ill appearing


HEENT: EOMI, moist MMs, sclera anicteric


Respiratory: no rhonchi, no wheezing, accessory muscle use (Accessory muscle use

with conversation), diminished lung sound


Cardiovascular: irregular


Gastrointestinal: soft, non-tender


Deviation from normal: obese


Genitourinary: continent


Musculoskeletal: no cyanosis, edema present


Neurology: moves all 4 limbs, no focal deficits


Skin: cap refill <2 seconds, no lesions, no rash


Psychiatric: A&O x 3, normal affect, normal mood





- Problem List


(1) Acute on chronic congestive heart failure


Code(s): I50.9 - HEART FAILURE, UNSPECIFIED   Current Visit: Yes   Status: Acute

  





(2) Palliative care encounter


Code(s): Z51.5 - ENCOUNTER FOR PALLIATIVE CARE   Current Visit: Yes   Status: 

Acute   





(3) Idiopathic thrombocythemia


Code(s): D47.3 - ESSENTIAL (HEMORRHAGIC) THROMBOCYTHEMIA   Current Visit: Yes   

Status: Acute   





(4) Diabetes


Code(s): E11.9 - TYPE 2 DIABETES MELLITUS WITHOUT COMPLICATIONS   Current Visit:

Yes   Status: Acute   





(5) Elevated liver enzymes


Code(s): R74.8 - ABNORMAL LEVELS OF OTHER SERUM ENZYMES   Current Visit: Yes   

Status: Acute   





- Plan/Recommendations


Plan:


Discussed Mr Baumann current heart failure and potential transition to [place]. 

Inotorpe dependent, ITP, altered liver enzymes and declining functional status 

are all considerations in Goal of Care conversation.  








In live review he has lived in [place] since , and states his favorite thing 

is spending time iwth Nighat.  His goal is to watch his grandson graduate in 

December.





Lengthy conversation with Nighat, she is uncertain where Mr Hsu can live if he

can not return to independent home setting as well as uncertain of availability 

of family for assistance with commute to [facility] center in [place] for follow up

appointments.  





After the initial conversation with Mr Hsu and Nighat his caregiver It was 

noted that he was not accepted to [facility] for consideration for LVAD.  Will r

evisit goal of care with Mr Hsu.





Communicated with Dr Velasquez. 

















[70] minutes spent on this encounter with >50% of the time in counseling and 

coordination of care.





Thank you for this very appropriate consult.

## 2020-10-05 NOTE — CON
DATE OF CONSULTATION:  



REASON FOR CONSULTATION:  Thrombocytopenia.



HISTORY OF PRESENT ILLNESS:  Mr. Hsu is a 74-year-old gentleman with advanced

cardiac dysfunction, who presented to the emergency room essentially in 
cardiogenic

shock.  He was admitted to the ICU and has been on blood pressure support.  On

admission, his platelet count was 101,000.  It has drifted downward to a current

37,000.  Mr. Hsu has a medical history of long-standing chronic 
thrombocytopenia

as far back as .  He has seen Dr. Bernstein in the past, last time was in 
2017.

His platelets were 58,000 at that time.  He was admitted to this facility in

2019.  He was seen by me during that visit, where his platelets were low of

26,000.  He had been recently seen by Dr. Linton and diagnosed with cirrhosis.  He
had

pulmonary venous congestion, cardiomegaly, splenomegaly, and possible portal

hypertension, likely contributing to his worsening thrombocytopenia.  During 
this

admission, he was transfused 2 units of packed RBCs for a hemoglobin of 7.9.  He
had

a good response at 9.4, but has drifted downward again and it is currently 8.7. 
He

does state he has occasional nosebleeds, but denies any blood in urine or stool.

Iron studies done in this hospitalization show mild iron deficiency.  Retic 
count is

slightly elevated at 4.5.  He was seen at bedside, he is sitting in a chair, in 
no

distress.  He does appear to have some short memory issues as he repeated the 
same concern

to me 3 times during our discussion. 



PAST MEDICAL HISTORY:  

1. Chronic thrombocytopenia, present since at least .

2. History of iron-deficient anemia.

3. Cirrhosis with mild splenomegaly, diagnosed in 2019.

4. Hyperbilirubinemia.

5. Type 2 diabetes.

6. Coronary artery disease.

7. Hyperlipidemia.

8. Diastolic dysfunction.



PAST SURGICAL HISTORY:  

1. Coronary artery bypass grafting.

2. Back surgery.



ALLERGIES:  NO KNOWN DRUG ALLERGIES.



HOME MEDICATIONS:  

1. Benazepril.

2. Metformin.

3. K-Dur.

4. Lasix.

5. Niacin.

6. Prilosec.

7. Oxybutynin chloride.

8. Pioglitazone.

9. Saw palmetto.

10. D3.

11. Coreg.

12. Aspirin.

13. Ranexa.

14. Multivitamin.



FAMILY HISTORY:  Brother and son have hemochromatosis.  The patient has tested

negative in the past.  The father  from lung cancer. 



SOCIAL HISTORY:  , has 2 children.  Lives with girlfriend.  Former 
smoker.

No alcohol or illicit drug use. 



REVIEW OF SYSTEMS:  Twelve-point review of systems is negative except for noted 
in

HPI. 



PHYSICAL EXAMINATION:

VITAL SIGNS:  Temperature 98.0, pulse 63, respiratory rate 22, blood pressure is

89/51, and he is 90% on 2 L. 

GENERAL:  Chronically ill-appearing male, in no acute distress. 

HEENT:  Normocephalic and atraumatic.  Pupils equal and reactive to light. 

NECK:  Supple. 

CV:  Regular rate and rhythm. 

LUNGS:  Clear anterior. 

ABDOMEN:  Soft. 

SKIN:  There is no rash. 

HEMATOLOGIC:  He has scattered bruising on his arms. 

NEUROLOGIC:  Nonfocal.



PERTINENT LABORATORY DATA AND X-RAYS:  Current WBCs 4.8, hemoglobin 8.7, 
hematocrit

27.0, and platelet count is 37,000.  He has 77% neutrophils, 11% lymphocytes, 
and

retic is 4.5.  PT 17.3, INR is 1.4, and PTT 41.8.  Sodium 131, potassium 4.9,

chloride 102, CO2 is 20, BUN is 55, creatinine 1.37, and calcium 8.4.  Magnesium

2.2.  Iron 13, TIBC 301, and iron saturation is 4.  Bilirubin is 2.8, AST is 
179,

ALT is 283, alkaline phosphatase is 113.  Serum total protein is 5.9, albumin 
3.4,

and globulin 3.5.  COVID PCR negative.  Abdominal ultrasound showed 
heterogeneous

echogenicity of the liver, suggested nodular cirrhotic contour with splenomegaly
and

mild ascites. 



ASSESSMENT:  

1. Chronic thrombocytopenia, multifactorial.

2. Cirrhosis with hypersplenism.

3. Hyperbilirubinemia.

4. Congestive heart failure.

5. Chronic anemia.



DISCUSSION:  The patient's platelet value was 101,000 on admission.  Throughout 
the

course of this hospitalization, it has decreased likely due to his acute 
illness.

He does have splenic pooling of platelets. INR slightly elevated at 1.4.  He is 
complaining of a mild nosebleed,

but no other bleeding. No platelet transfusion at this time. He has been 
transfused 2 units of packed RBCs.  I will give

him a dose of IV iron. Consider GI consult for opinion regarding his liver 
disease as it

appears he has not seen a GI physician in about a year.  We will check a CBC 
daily

and follow along with his hospital course. Case discussed with Dr. Bernstein.



Thank you for the consult.







Job ID:  [id number]



ALESHIA

## 2020-10-05 NOTE — PRG
DATE OF SERVICE:  10/05/2020



SUBJECTIVE:  The patient is actually doing fairly well this morning.  He has been

weaned down to just dobutamine.  He is off the vasopressors. 



OBJECTIVE:  VITAL SIGNS:  Temperature 97.6, pulse 101, blood pressure 87/47.  He is

currently on dobutamine at 5 mcg/kg per minute. 

HEENT:  Unremarkable. 

NECK:  No JVD. 

LUNGS:  Diminished breath sounds with some crackles in the bases. 

CARDIAC:  S1 and S2, regular. 

ABDOMEN:  Soft. 

EXTREMITIES:  Trace edema.



LABORATORY DATA:  White blood cell count 4.8, hematocrit 27, and platelet count 37.

Sodium 131, potassium 4.9, chloride 102, CO2 of 20, BUN 55, creatinine 1.4, glucose

166, , and . 



ASSESSMENT:  

1. Congestive heart failure - biventricular.

2. Inotrope dependence.

3. Thrombocytopenia.

4. Elevated liver enzymes, likely secondary to passive liver congestion.



PLAN:  Transfer to [place] is being contemplated for LVAD placement.  Inotropes are

being written for by Dr. Velasquez __________ available as needed. 







Job ID:  [id number]

## 2020-10-05 NOTE — PDOC.FMACP
Advance Care Planning





- Problem


(1) Acute on chronic congestive heart failure


Status: Acute   Code(s): I50.9 - HEART FAILURE, UNSPECIFIED   





(2) Palliative care encounter


Status: Acute   Code(s): Z51.5 - ENCOUNTER FOR PALLIATIVE CARE   





(3) Idiopathic thrombocythemia


Status: Acute   Code(s): D47.3 - ESSENTIAL (HEMORRHAGIC) THROMBOCYTHEMIA   





(4) Diabetes


Status: Acute   Code(s): E11.9 - TYPE 2 DIABETES MELLITUS WITHOUT COMPLICATIONS 

 





(5) Elevated liver enzymes


Status: Acute   Code(s): R74.8 - ABNORMAL LEVELS OF OTHER SERUM ENZYMES   





- Note


Participants: patient, palliative care


Summary: 


Revisited Advanced Care Planning.    The diagnosis, prognosis and goals of care 

were discussed.  Appropriate forms and documentation to accomplish the goals of 

care were discussed.  All questions were answered. Mr Hsu had previously 

listed his son as MPOA, he has since passed.  Palliative Care will assist in 

completing as well as completion of OOHDNAR if Mr Hsu desires.  





Please refer to Palliative Care notes in note section.  





Time Spent (mins): 15

## 2020-10-05 NOTE — PRG
DATE OF SERVICE:  10/05/2020



SUBJECTIVE:  Mr. Hsu's status is stable.  His blood pressure appears to be

slightly better.  Unfortunately, his platelet count continues to decrease.  His

platelet count today was 37,000. 



OBJECTIVE:  VITAL SIGNS:  Blood pressure 84/54, pulse 98, temperature afebrile. 

LUNGS:  Crackles bilaterally. 

HEART:  Regular rate and rhythm. 

ABDOMEN:  Soft, nontender, nondistended. 

EXTREMITIES:  2+ pitting edema.



PERTINENT LABORATORY DATA:  Hemoglobin 8.7.  Creatinine 1.37.  Sodium 131, AST and

ALT are 179/283. 



IMPRESSION:  

1. Acute systolic heart failure.

2. Coronary artery disease.

3. Status post bypass surgery.

4. Chronic ITP.

5. Renal insufficiency.



RECOMMENDATIONS:  

1. Albumin in addition to p.o. amiodarone has been added.

2. Continue digoxin.

3. Lasix also added, but may be difficult due to hypotension.

4. There was some interest in transfer to [place] for LVAD, although concerns about

chronic thrombocytopenia.  He is unlikely to be a candidate for LVAD.  We will

discuss with Dr. Sebastian Velasquez. 







Job ID:  [id number]

## 2020-10-06 VITALS — DIASTOLIC BLOOD PRESSURE: 48 MMHG | SYSTOLIC BLOOD PRESSURE: 85 MMHG

## 2020-10-06 LAB
ALBUMIN SERPL BCG-MCNC: 3.6 G/DL (ref 3.4–4.8)
ALP SERPL-CCNC: 105 U/L (ref 40–110)
ALT SERPL W P-5'-P-CCNC: 199 U/L (ref 8–55)
ANION GAP SERPL CALC-SCNC: 15 MMOL/L (ref 10–20)
ANISOCYTOSIS BLD QL SMEAR: (no result) (100X)
AST SERPL-CCNC: 79 U/L (ref 5–34)
BASOPHILS # BLD AUTO: 0 THOU/UL (ref 0–0.2)
BASOPHILS NFR BLD AUTO: 0 % (ref 0–1)
BILIRUB SERPL-MCNC: 3 MG/DL (ref 0.2–1.2)
BUN SERPL-MCNC: 60 MG/DL (ref 8.4–25.7)
CALCIUM SERPL-MCNC: 8.6 MG/DL (ref 7.8–10.44)
CHLORIDE SERPL-SCNC: 101 MMOL/L (ref 98–107)
CO2 SERPL-SCNC: 19 MMOL/L (ref 23–31)
CREAT CL PREDICTED SERPL C-G-VRATE: 52 ML/MIN (ref 70–130)
EOSINOPHIL # BLD AUTO: 0 THOU/UL (ref 0–0.7)
EOSINOPHIL NFR BLD AUTO: 0 % (ref 0–10)
GLOBULIN SER CALC-MCNC: 2.1 G/DL (ref 2.4–3.5)
GLUCOSE SERPL-MCNC: 206 MG/DL (ref 83–110)
HGB BLD-MCNC: 8.4 G/DL (ref 14–18)
HGB BLD-MCNC: 8.7 G/DL (ref 14–18)
LYMPHOCYTES # BLD: 0.3 THOU/UL (ref 1.2–3.4)
LYMPHOCYTES NFR BLD AUTO: 7 % (ref 21–51)
MAGNESIUM SERPL-MCNC: 2.3 MG/DL (ref 1.6–2.6)
MCH RBC QN AUTO: 30.8 PG (ref 27–31)
MCH RBC QN AUTO: 31.1 PG (ref 27–31)
MCV RBC AUTO: 94.3 FL (ref 78–98)
MCV RBC AUTO: 94.5 FL (ref 78–98)
MDIFF COMPLETE?: YES
MONOCYTES # BLD AUTO: 0.2 THOU/UL (ref 0.11–0.59)
MONOCYTES NFR BLD AUTO: 3.5 % (ref 0–10)
NEUTROPHILS # BLD AUTO: 3.8 THOU/UL (ref 1.4–6.5)
NEUTROPHILS NFR BLD AUTO: 89.5 % (ref 42–75)
OVALOCYTES BLD QL SMEAR: (no result) (100X)
PLATELET # BLD AUTO: 29 THOU/UL (ref 130–400)
PLATELET # BLD AUTO: 58 THOU/UL (ref 130–400)
POLYCHROMASIA BLD QL SMEAR: (no result) (100X)
POTASSIUM SERPL-SCNC: 5.3 MMOL/L (ref 3.5–5.1)
RBC # BLD AUTO: 2.72 MILL/UL (ref 4.7–6.1)
RBC # BLD AUTO: 2.83 MILL/UL (ref 4.7–6.1)
SODIUM SERPL-SCNC: 130 MMOL/L (ref 136–145)
TARGETS BLD QL SMEAR: (no result) (100X)
WBC # BLD AUTO: 4.2 THOU/UL (ref 4.8–10.8)
WBC # BLD AUTO: 5 THOU/UL (ref 4.8–10.8)

## 2020-10-06 PROCEDURE — 30233R1 TRANSFUSION OF NONAUTOLOGOUS PLATELETS INTO PERIPHERAL VEIN, PERCUTANEOUS APPROACH: ICD-10-PCS | Performed by: FAMILY MEDICINE

## 2020-10-06 RX ADMIN — ALBUTEROL SULFATE SCH: 2.5 SOLUTION RESPIRATORY (INHALATION) at 15:29

## 2020-10-06 RX ADMIN — DOBUTAMINE HYDROCHLORIDE SCH MLS: 200 INJECTION INTRAVENOUS at 00:40

## 2020-10-06 RX ADMIN — ALBUMIN HUMAN SCH GM: 250 SOLUTION INTRAVENOUS at 12:25

## 2020-10-06 RX ADMIN — DOBUTAMINE HYDROCHLORIDE SCH MLS: 200 INJECTION INTRAVENOUS at 15:56

## 2020-10-06 RX ADMIN — Medication SCH ML: at 09:10

## 2020-10-06 RX ADMIN — INSULIN HUMAN PRN UNIT: 100 INJECTION, SOLUTION PARENTERAL at 21:13

## 2020-10-06 RX ADMIN — ALBUTEROL SULFATE SCH MG: 2.5 SOLUTION RESPIRATORY (INHALATION) at 11:16

## 2020-10-06 RX ADMIN — INSULIN HUMAN PRN UNIT: 100 INJECTION, SOLUTION PARENTERAL at 05:36

## 2020-10-06 RX ADMIN — ALBUTEROL SULFATE SCH MG: 2.5 SOLUTION RESPIRATORY (INHALATION) at 06:43

## 2020-10-06 RX ADMIN — Medication SCH ML: at 20:52

## 2020-10-06 RX ADMIN — INSULIN HUMAN PRN UNIT: 100 INJECTION, SOLUTION PARENTERAL at 16:59

## 2020-10-06 RX ADMIN — Medication SCH MLS: at 14:29

## 2020-10-06 RX ADMIN — ALBUMIN HUMAN SCH GM: 250 SOLUTION INTRAVENOUS at 17:47

## 2020-10-06 RX ADMIN — ALBUTEROL SULFATE SCH: 2.5 SOLUTION RESPIRATORY (INHALATION) at 22:10

## 2020-10-06 RX ADMIN — ALBUTEROL SULFATE SCH MG: 2.5 SOLUTION RESPIRATORY (INHALATION) at 18:13

## 2020-10-06 RX ADMIN — INSULIN HUMAN PRN UNIT: 100 INJECTION, SOLUTION PARENTERAL at 12:24

## 2020-10-06 NOTE — PRG
DATE OF SERVICE:  10/06/2020



SERVICE:  Advanced Heart Failure Cardiology Consulting Service.



SUBJECTIVE:  Mr. Hsu had a variable day.  An attempt was made to change from

dobutamine to dopamine.  It was unsuccessful.  Dopamine only increased his heart

rate and dropped his systolic blood pressure.  Consequently, dobutamine

had to be titrated up, dopamine had to be stopped.  Milrinone of 0.125 mcg was

added to attempt to help with the right heart more and help with better

decongestion, so the combination of dobutamine and milrinone apparently worked.

Over time, his systolic blood pressure increased to 90s, and overnight, his 
systolic

blood pressure went above 100 and between 100 and 110 at times.  So,

hemodynamically, he did improve with dobutamine plus milrinone combination.  
There

have been several conversations with [facility].  Please see the assessment 
portion

for that.  Mr. Hsu says he actually feels good.  He does not feel bad at all.
 He

does get shortness of breath with exertion.  He is not able to eat solid food, 
but

then he says he is able to eat soft foods.  He still is willing to pursue [facility]

if possible. 



Hematology/Oncology was consulted.  They will treat him for ITP.  They also 
believe

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  However, they will attempt a trial
of

ITP treatment. 



REVIEW OF SYSTEMS:  GENERAL:  There is no fever or chills. 

HEENT:  There is no change in vision, hearing, or swallowing. 

PULMONARY:  Please see HPI. 

CARDIAC:  There is no complaint of chest pain, palpitation, or syncope. 

GI:  Please see HPI. 

:  He is able to urinate on his own. 

MUSCULOSKELETAL:  He is not complaining of back or neck pain. 

INTEGUMENT:  There is no new skin breakdown. 

NEUROLOGIC:  There are no acute deficits or weaknesses.



MEDICATIONS:  Medications that have cardiac effect include currently,

1. Albuterol q.4 hours.

2. Amiodarone 200 mg b.i.d.

3. Digoxin 0.125 daily.

4. Dobutamine currently at 5 mcg/kg per minute.

5. Moderate sliding scale insulin.

6. Magnesium oxide 400 mg daily.

7. Milrinone currently at 0.125 mcg/kg per minute.

8. Protonix 40 mg daily.

9. Prednisone now at 50 mg b.i.d., started at 5 p.m. last night.



DIAGNOSTIC STUDIES:  Telemetry was reviewed.  It is a regular rhythm.  It is 
either

sinus tachycardia or high sinus rate.  There is some portion that looks like 
atrial

flutter, however, overall it is regular rhythm.  There is occasional PVC.  There
is

no concerning arrhythmia at this point. 



PHYSICAL EXAMINATION:

VITAL SIGNS:  His current vitals are heart rate 99, blood pressure 107/65. 

GENERAL:  He is alert, conversational, appearing comfortable, reclining in bed. 

HEENT:  Shows EOMI.  Oropharynx benign. 

NECK VEINS:  JVP is elevated at least 13 cm or higher, is near his earlobe. 

PULMONARY:  There is good air movement, but there are bibasilar crackles. 

CARDIAC:  Tachycardic, regular rhythm, normal S1 and S2, there is 2/6 
holosystolic

murmur at the left sternal border and 2/6 holosystolic murmur near the apex. 

ABDOMEN:  Soft, mildly distended. Positive bowel sounds. 

EXTREMITIES:  He has greater than 1.5 cm pitting edema from feet up towards 
knees.

There is some pitting edema in his thighs. 



LABORATORY VALUES:  This morning, sodium 130, potassium 5.3, chloride 101,

bicarbonate 19, BUN is 60 and creatinine 1.59, magnesium 2.3.  His net positive

yesterday 2122 in and 660 out. 



ASSESSMENT:  74-year-old  gentleman suffers from multitude of problems.

He is in acute on chronic heart failure with reduced ejection fraction with both

systolic and diastolic dysfunctions.  It is likely to be a nonischemic

cardiomyopathy, however, without coronary angiogram, this cannot be for certain.
 He

also has right ventricular failure - cor pulmonale.  This makes the overall

treatment very difficult.  So far, he is responding to dobutamine and milrinone.
 We

will attempt to maximize this.  He is also very much volume overloaded.  His 
volume

overload situation makes his liver worse and also causing more splenic

sequestration.  We will attempt to be more aggressive about diuresis today once 
his

drips are at target.  Appreciate Hematology/Oncology's input on prednisone.  We 
will

follow their recommendations and hoping for good platelet response.  Right now, 
we

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 



He has cardiac renal syndrome.  Unfortunately, we have no choice but to diurese.
 We

need to decongest his liver, especially his spleen for him to have any chance. 



We are having daily conversation with Dr. Oswaldo Champagne, who represents [facility].  [facility] is still interested in the patient.  They want to see him

improved on ITP treatment.  They also want a GI consult to assess the cause of

anemia.  [facility] is currently not accepting him.  However, they are still

interested in him.  They want us to provide daily reports and take him when they

feel like that he has a chance.  Please see the following for my 
recommendations. 



RECOMMENDATIONS:  

1. Increase milrinone to 0.25 mcg/kg per minute.

2. After 2 hours on this dosage, check his blood pressure.  If the blood 
pressure is

steady above 90 or maintain his MAP above 65, we will do more aggressive 
diuresis. 

3. Aggressive diuresis will include metolazone 5 mg followed by Lasix 80 mg IV 
at 30

minutes after metolazone and we will monitor his output.  If need to, we may 
need to

replete. 

4. Agree with Dr. Randall' request to increase the frequency of albumin

supplementation to 25 g albumin q.6 hours. 

5. We will ask opinion from Electrophysiology about changing from amiodarone to

Tikosyn.  This is intended to avoid any complications of amiodarone-induced 
liver

dysfunction.  The patient has congested liver.  He already has liver 
dysfunction.

The idea behind Tikosyn is still the same rhythm control, but avoiding the liver

complication. 

6. Please do another ECG now so we can get a good QT interval.  With good 
interval

calculation, then we can decide on Tikosyn or not. 

7. Please consider GI consult per the request from [facility]. 



It has been a pleasure taking care of Mr. Hsu.  If you have any questions, 
please

give me a call. 



This visit took about 70 minutes.  This includes personally performing history 
and

physical, titration of drips, interacting with multiple services including [facility], and direct patient interaction including holding small family conference

with the patient and his girlfriend/wife. 







Job ID:  [id number]



[facility]OLTAN

## 2020-10-06 NOTE — EKG
Test Reason : 

Blood Pressure : ***/*** mmHG

Vent. Rate : 100 BPM     Atrial Rate : 100 BPM

   P-R Int : 208 ms          QRS Dur : 134 ms

    QT Int : 358 ms       P-R-T Axes : 005 116 -49 degrees

   QTc Int : 461 ms

 

Atrial tachycardia

Marked ST abnormality, possible septal subendocardial injury

Right bundle branch block

Left posterior fascicular block

*** Bifascicular block ***

Abnormal ECG

Confirmed by LURDES HUMPHRIES ([id number]) on 10/6/2020 2:27:59 PM

 

Referred By:  TONG           Confirmed By:LURDES HUMPHRIES

## 2020-10-06 NOTE — PDOC.MOPN
Interval History: 





sitting in chair, denies bleeding.





- Vital Signs


Vital Signs: 


                             Vital Signs (12 hours)











  Temp Pulse Pulse Resp BP Pulse Ox


 


 10/06/20 12:45  98.1 F   100  11 L  85/48 L 


 


 10/06/20 12:00  97.9 F     


 


 10/06/20 11:30  97.9 F   104 H  18  93/47 L 


 


 10/06/20 11:16   102 H   28 H   95


 


 10/06/20 11:15  97.9 F   103 H  10 L  79/38 L 


 


 10/06/20 09:09   100    


 


 10/06/20 08:15  98.3 F   101 H  18  96/74 


 


 10/06/20 08:00  98.4 F     


 


 10/06/20 07:00  98.3 F   99  18  108/67 


 


 10/06/20 06:43   100   20   95


 


 10/06/20 06:40  97.5 F L     


 


 10/06/20 04:00  98.7 F     








                                     Weight











Weight                         210 lb 15.718 oz











                            Most Recent Monitor Data











Heart Rate from ECG            100


 


NIBP                           85/50


 


NIBP BP-Mean                   61


 


Respiration from ECG           23


 


SpO2                           95

















- Physical Exam


General: Alert


HEENT: Atraumatic, PERRLA, EOMI, Mucous membr. moist/pink


Lungs: Clear to auscultation


Cardiovascular: Regular rate


Abdomen: Normal bowel sounds


Neurological: Normal speech





- Labs


Result Diagrams: 


                                 10/06/20 04:03





                                 10/06/20 04:03


Lab results: 


                         Laboratory Results - last 24 hr





10/06/20 11:33: POC Glucose 184 H


10/06/20 05:40: POC Glucose 197 H


10/06/20 04:03: WBC 4.2 L, RBC 2.83 L, Hgb 8.7 L, Hct 26.7 L, MCV 94.3, MCH 

30.8, MCHC 32.7, RDW 16.3 H, Plt Count 29 L*, MPV 11.7 H, Neutrophils % 89.5 H, 

Neutrophils % (Manual) Not Reportable, Lymphocytes % 7.0 L, Monocytes % 3.5, 

Eosinophils % 0.0, Basophils % 0.0, Neutrophils # 3.8, Lymphocytes # 0.3 L, 

Monocytes # 0.2, Eosinophils # 0.0, Basophils # 0.0


10/06/20 04:03: Sodium 130 L, Potassium 5.3 H, Chloride 101, Carbon Dioxide 19 

L, Anion Gap 15, BUN 60 H, Creatinine 1.59 H, Estimated GFR (MDRD) 43, Glucose 

206 H, Calcium 8.6, Magnesium 2.3, Total Bilirubin 3.0 H, AST 79 H,  H, 

Alkaline Phosphatase 105, Serum Total Protein 5.7 L, Albumin 3.6, Globulin 2.1 

L, Albumin/Globulin Ratio 1.7


10/05/20 20:52: POC Glucose 169 H


10/05/20 17:02: POC Glucose 220 H


10/03/20 12:56: Blood Type O NEGATIVE, Antibody Screen NEGATIVE, Crossmatch See 

Detail








Status: lab reviewed by me





A/P





- Problem


(1) Thrombocytopenia


Current Visit: Yes   Code(s): D69.6 - THROMBOCYTOPENIA, UNSPECIFIED   Status: 

Acute   





(2) Hypersplenism


Current Visit: Yes   Code(s): D73.1 - HYPERSPLENISM   Status: Acute   





(3) Anemia


Current Visit: Yes   Code(s): D64.9 - ANEMIA, UNSPECIFIED   Status: Acute   





(4) Acute on chronic congestive heart failure


Current Visit: Yes   Code(s): I50.9 - HEART FAILURE, UNSPECIFIED   Status: Acute

  





(5) Elevated liver enzymes


Current Visit: Yes   Code(s): R74.8 - ABNORMAL LEVELS OF OTHER SERUM ENZYMES   

Status: Acute   





- Plan


Plan: 





1. Patient was started on trial of steroids for ITP. Discussed with Dr. Bernstein 

and feel steroids unlikely to improve counts but ok to try


2. Unfortunately, patient was transfused 2 units platelets this am making it 

difficult to determine effectiveness of steroids.


3. Recommend avoid platelet transfusion unless platelets < 10, or bleeding.


4. 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 


5. Monitor CBC.

## 2020-10-06 NOTE — CT
Exam: Chest CT without contrast

Abdomen CT without contrast

Pelvic CT without contrast



HISTORY: Right flank hematoma. Abdominal pain and shortness of breath. Evaluate for bleed



FINDINGS:



CHEST: Limited evaluation mediastinum due to the lack of IV contrast. No mass, lymphadenopathy or hem
atoma

Aorta: Atherosclerosis. No evidence of periaortic fat stranding. There is aneurysmal dilatation of th
e infrarenal abdominal aorta, measuring 4.7 x 4.5 cm. No obvious periaortic fat stranding.

Technique limits evaluation.

Trachea and central bronchi are patent

Small right and moderate left pleural effusion. Adjacent atelectatic changes are noted. Patchy ground
glass opacities and septal thickening likely representing edema.



HEART: Cardiomegaly. Coronary calcifications.

Abdomen CT: Nonspecific calcification the gallbladder fossa, incompletely evaluated. Cirrhotic change
s of the liver with nodularity. There is splenomegaly. There are splenic varices. Atrophy of the

pancreas. Grossly unremarkable adrenal glands. Limited evaluation of the solid organs due to lack of 
IV contrast

Mild edema throughout the mesentery. There is evidence of free fluid in the pelvis with attenuation c
oefficient of 0 Hounsfield units.

Kidneys: Bilaterally no obstructive uropathy.

Symmetric attenuation of the psoas muscles. No retroperitoneal hematoma.

Intrahepatic IVC is prominent. Correlate for right heart failure

No mesenteric mass or lymphadenopathy. No free air.

Limited evaluation of the alimentary canal by the lack of oral contrast. No small bowel obstruction. 
Normal ileocecal junction. Suggestion of a normal caliber appendix. Scattered fecal material in a

decompressed colon.

Soft tissue structures: Diffuse edema/anasarca.

Pelvic CT: Free fluid in the pelvis. No mass, lymphadenopathy or free air.

Nonspecific stranding of the presacral fat. Unremarkable urinary bladder.

No lytic or blastic lesions in the osseous structures.



IMPRESSION:

1. No evidence of a hematoma in the chest, abdomen or pelvis.

2. Findings suggesting cirrhosis with complex ascites. Splenomegaly. Varices.

3. Pleural effusion with atelectasis

4. Soft tissue edema has developed since the CT angiogram of the chest from 9/29/2020.

5. Probable gallstone in a severely contracted gallbladder.

6. Aneurysmal dilatation of the infrarenal abdominal aorta.



Reported By: Mark Beltran 

Electronically Signed:  10/6/2020 11:25 PM

## 2020-10-06 NOTE — CON
DATE OF CONSULTATION:  10/06/2020



REQUESTING PHYSICIAN:  Darryn Randall MD



REASON FOR CONSULTATION:  Anemia.



HISTORY OF PRESENT ILLNESS:  Conrad Hsu is a very pleasant, but quite ill

74-year-old gentleman, who was admitted to the hospital a week ago with cardiogenic

shock from acute on chronic biventricular systolic and diastolic heart failure.

Notably, he has seen my partner, Dr. Henrique Linton in the past in the GI[facility]Liver Clinic.

He was last seen about a year ago.  He had been diagnosed with cirrhosis, which was

thought to be cryptogenic from fatty liver.  Last year, this was otherwise well

compensated, but he did have an EGD and a colonoscopy in February 2019.  The

colonoscopy was completely normal.  The EGD at that time showed moderate portal

hypertensive gastropathy as well as grade 2 esophageal varices.  He was started on

nonselective beta-blocker at that time.  He has no history of encephalopathy or

jaundice or any liver lesions on surveillance imaging.  With regard to this current

admission over the past week, he has been treated aggressively with dobutamine,

Milrinone, and amiodarone drips.  There have been issues keeping his blood pressure

up as well as with his fluid status.  He has started to develop cardiorenal

syndrome.  The possibility of transferred to [facility] for consideration of LVAD

has been entertained, but [facility] has not accepted the patient as yet.  Along

with all this, there had been concerns about the patient's blood counts.  Hemoglobin

was 9.4 on admission and has been trending down a bit at each day.  He has received

3 units of RBCs and hemoglobin this morning is 8.7.  He is also having worsening

thrombocytopenia.  This is chronic, but more of a problem this admission.  Platelets

that came down to 29,000 today and he is receiving 1 unit of platelet transfusion.

He was evaluated by the Hematology/Oncology Service, also started on prednisone for

possible ITP, though hypersplenism is suspected to be the primary etiology of this.

With regard to the anemia, the patient denies any overt bleeding from anywhere.  He

says he rarely has small bowel movements, which are brown in color.  There is no

melena or hematochezia.  He denies any hematemesis.  He has had issues with

nosebleeds in the past, but none recently.  He does occasionally cough up some

bloody-tinged sputum, which is secondary to the heart failure.  His appetite is

poor, but he is not having any abdominal pain or nausea. 



REVIEW OF SYSTEMS:  Full review of systems including constitutional, head, eyes,

ears, nose, throat, GI, , cardiovascular, respiratory, musculoskeletal, neurologic

systems is negative except as noted in the HPI. 



PAST MEDICAL HISTORY:  Coronary artery disease, status post CABG; chronic

thrombocytopenia; atrial flutter; pulmonary hypertension; cor pulmonale; cirrhosis,

cryptogenic; diabetes; acid reflux; chronic anemia; congestive heart failure with

advanced diastolic dysfunction; esophageal varices, grade 2, seen on EGD in 2019;

portal hypertensive gastropathy, moderate, seen on EGD in 2019. 



FAMILY HISTORY:  Noncontributory.



SOCIAL HISTORY:  No current tobacco or alcohol use.  He evidently drank heavily 30

or 40 years ago, but nothing recently. 



ALLERGIES:  NO KNOWN DRUG ALLERGIES.



HOSPITAL MEDICATIONS:  

1. Tylenol p.r.n.

2. Albuterol nebulizer q.4 hours.

3. Amiodarone 200 mg twice daily.

4. Digoxin 0.125 mg daily.

5. Dobutamine drip.

6. Insulin per sliding scale.

7. Magnesium oxide 400 mg daily.

8. Zofran p.r.n.

9. Protonix 40 mg p.o. daily.

10. Prednisone 50 mg p.o. twice daily.

11. Milrinone drip.



PHYSICAL EXAMINATION:

VITAL SIGNS:  Pulse 102, blood pressure 87/48, oxygen saturation 93% on 3 L nasal

cannula, and temperature is 98.3. 

GENERAL:  Chronically ill, but nontoxic-appearing 74-year-old man, sitting up in

chair comfortably, in no acute distress. 

SKIN:  No jaundice.  No rashes were palpable. 

EYES:  No scleral icterus.  Extraocular movements intact. 

ENT:  Mucous membranes moist.  No oral lesions. 

LYMPH:  No submandibular or supraclavicular lymphadenopathy. 

THYROID:  Nontender to palpation. 

HEART:  Regular.  Tachycardia. 

LUNGS:  Bibasilar crackles. 

ABDOMEN:  Nondistended.  Bowel sounds are present.  Soft, nontender to palpation. 

EXTREMITIES:  2+ bilateral lower extremity edema. 

NEURO:  Cranial nerves II through XII intact bilaterally.  No focal deficits.



LABORATORY STUDIES:  WBC is 4.2, hemoglobin 8.7, MCV 94.3, and platelets 29,000.

Sodium 130, potassium 5.3, BUN 60, creatinine climbing up to 1.59, glucose 184,

calcium 8.6, magnesium 2.3, total bilirubin 3.0, alkaline phosphatase 105, AST 79,

, and albumin 3.6.  Lipase only 19.  TSH 0.68.  BNP is 7841.3. 



IMAGING STUDIES:  Abdominal ultrasound from this admission shows heterogeneous

nodular liver with splenomegaly and ascites.  Gallbladder is not visualized, but

common bile duct is normal.  Echocardiogram from this admission demonstrates

ejection fraction 25% to 30% with grade 3 diastolic dysfunction. 



ASSESSMENT AND PLAN:  

1. Anemia, multifactorial.  The patient does have chronic anemia, but has required 3

units RBC transfusion this admission in the context of worsening thrombocytopenia

and no overt gastrointestinal bleeding.  He does have a known history of grade 2

esophageal varices, but this is not consistent with variceal bleed.  He has known

portal hypertensive gastropathy and in the context of worsening thrombocytopenia,

this is undoubtedly contributing to some chronic blood loss.  He had a completely

normal colonoscopy within the past 2 years.  I do not see any utility to repeating

colonoscopy and I really see very low utility for putting him through the risk of

upper endoscopy either, which would be significant with this degree of

thrombocytopenia and tenuous cardiac status.  I think the risks would outweigh the

potential benefits in terms of diagnostics and especially in terms of any

therapeutics.  We will not plan for any endoscopic investigation.  That being said,

if Cardiology really desires this to be done and will clear him for the procedure,

we could consider it.  We will follow up on this tomorrow. 

2. Cirrhosis.  This has been classified as cryptogenic by Dr. Linton last year.

Consider may be secondary to chronic congestion from his right heart failure as

well.  This is all probably contributing to the difficulty of his fluid status

issues as well as his hypersplenism. 

3. Acute on chronic systolic and diastolic biventricular heart failure.  Cardiology

is following.  His status has been tenuous.  My understanding is that attempts are

being made to get him transferred for consideration of LVAD. 



Thank you for the consultation.  GI will follow along tomorrow.  Please call anytime

with questions or concerns. 







Job ID:  [id number]

## 2020-10-06 NOTE — EKG
Test Reason : 

Blood Pressure : ***/*** mmHG

Vent. Rate : 102 BPM     Atrial Rate : 102 BPM

   P-R Int : 000 ms          QRS Dur : 134 ms

    QT Int : 382 ms       P-R-T Axes : 000 108 -89 degrees

   QTc Int : 497 ms

 

Atrial fibrillation with rapid ventricular response

Right bundle branch block

T wave abnormality, consider inferior ischemia

Abnormal ECG

Confirmed by LURDES HUMPHRIES ([id number]) on 10/6/2020 2:24:22 PM

 

Referred By:  ADIA           Confirmed By:LURDES HUMPHRIES

## 2020-10-06 NOTE — CON
DATE OF CONSULTATION:  10/06/2020



This is Vikki Gonzalez NP dictating a report for Omer Augustin MD.



Consultation performed with collaboration of Dr. Omer Augustin.



REASON FOR CONSULTATION:  Antiarrhythmic management and atrial fibrillation.



HISTORY OF PRESENT ILLNESS:  Mr. Hsu is a 74-year-old gentleman, who presented in

acute on chronic systolic heart failure with hypotensive shock on 09/29/2020.

Echocardiogram found a recent decline in ejection fraction, previously 55% to 60% in

November 2019, now assessed at 25% to 30% on 10/02/2020.  Cardiology was consulted.

He does have a history of coronary artery bypass grafting.  His current condition is

tenuous with multiple comorbidities including chronic ITP and cardiorenal issues.

There was concern that he was in an atrial arrhythmia on 10/03/2020.  He was started

on low-dose oral amiodarone 200 mg b.i.d.  He was also given magnesium replacement.

He spontaneously converted to sinus rhythm and has maintained sinus rhythm since

that time.  There was seen to be some elevation in his liver enzymes and there was

concern with his amiodarone.  EP consultation has been requested for consideration

of alternate antiarrhythmic therapy. 



Currently, Mr. Hsu is feeling fairly well, but still weak with shortness of

breath and weakness in his legs.  He is currently on a milrinone and dobutamine

drips.  There has been difficulty with diuresing him due to hypotension and at times

tachycardia that would worsen his hypotension.  There is still some potential for

transferring to [place] with discussions for an LVAD.  His heart failure is

currently being managed by Dr. Velasquez.  No coronary angiogram has been performed, but

there is potential for this in the future. 



REVIEW OF SYSTEMS:  Positive for shortness of breath, fatigue, weakness, progressive

edema, lower extremity weakness.  He is negative for heart racing, palpitations,

chest pain, pressure, stroke-like symptoms, passing-out. 



PAST MEDICAL HISTORY:  

1. Atrial arrhythmias, previously on amiodarone transiently.

    a. Atypical atrial flutter.

2. Coronary artery disease.

    a. Coronary artery bypass grafting in the past.

3. Acute on chronic systolic heart failure.

    a. LVEF 55% to 60% by echocardiogram in 11/2019.

    b. LVEF 25% to 30%, 2D echo on 10/02/2020.

4. Bifascicular block with right bundle and left axis deviation, chronic.

5. Chronic thrombocytopenia.

6. Type 2 diabetes.

7. History of CLL.

8. Chronic kidney disease.



HOME MEDICATIONS:  

1. Glucophage 1000 mg q.a.m.

2. Coreg 6.25 mg p.o. b.i.d.

3. Vitamin D 5000 units daily.

4. Potassium chloride (K-Dur) 20 mEq daily.

5. Saw palmetto b.i.d.

6. Niacin b.i.d.

7. Theragran one tab daily.

8. Aspirin 81 mg daily.

9. Ranexa 500 mg p.o. b.i.d.

10. Pioglitazone 15 mg daily.

11. Oxybutynin 5 mg daily.

12. Omeprazole 20 mg at bedtime.

13. Benazepril 10 mg p.o. at bedtime.

14. Lasix 40 mg p.o. daily. 



Active cardiac medications:

1. Albumin 25 g q.6 hours.

2. Amiodarone 200 mg p.o. b.i.d.

3. Digoxin 125 mcg daily.

4. Dobutamine drip.

5. Magnesium oxide 400 mg daily.

6. Milrinone drip.



ALLERGIES:  NO KNOWN DRUG ALLERGIES.



CODE STATUS:  Do not resuscitate.



FAMILY HISTORY:  The patient does not recall if there is any sudden cardiac death or

early onset CAD. 



SOCIAL HISTORY:  He has a significant other named Vanesa Kowalski involved in his

life care and he is recently employed at a gas station.  The patient is a former

smoker.  Denies alcohol or illicit drug use. 



OBJECTIVE:  VITAL SIGNS:  5 feet 7 inches, 210 pounds.  Pulse 102, oxygen 95% on 3 L

via nasal cannula, blood pressure 87/48, respirations 28. 

GENERAL:  The patient is alert.  He is oriented.  He is a poor historian referring

to his significant other.  He is sitting upright in a chair at the edge of the bed.

He is in no apparent distress at the time of the exam. 

HEENT:  He is normocephalic and atraumatic.  Sclerae anicteric.  EOMs are intact.

Oral mucosa is moist and pink.  Poor dentition. 

NECK:  Supple.  Positive jugular venous distention.  No lymphadenopathy.  His

trachea is midline. 

LUNGS:  Have coarse bibasilar crackles to the mid lung fields bilaterally.  No

wheezes or rhonchi appreciated. 

HEART:  Rate is currently regular.  Slightly rapid.  Hepatojugular reflux is

positive. 

ABDOMEN:  Soft and nontender.  There are no palpable masses. 

EXTREMITIES:  Warm and dry to touch without clubbing or cyanosis, but extensive

peripheral edema pitting is noted bilaterally extending above his SCDs. 

NEUROLOGIC:  Grossly intact.  No focal deficits are noted.  Gait was not assessed.



TELEMETRY AND EKGS:  Baseline EKG shows sinus rhythm with a first-degree AV block,

280 milliseconds.  There is a right bundle-branch block with left anterior

fascicular block.  QRS measures 138 milliseconds and ventricular rate 100 beats per

minute.  QT//456 milliseconds. 



A 12-lead EKG and telemetry on October 3rd in the afternoon show potential for

atrial flutter with variable AV conduction.  Ventricular rates were fairly

controlled at approximately 100 beats per minute.  EKG the next morning shows

spontaneous conversion to sinus rhythm. 



Echocardiogram on 10/02/2020, LVEF 25% to 30%.



LABORATORY DATA:  On 10/06/2020; potassium 5.3, creatinine 1.59, creatinine

clearance calculated at 54, magnesium 2.3.  AST is 79, , alkaline phosphatase

105.  TSH 0.685.  BNP on 10/03/2020, 7841. 



IMPRESSION:  This is a 74-year-old gentleman with decompensated combined systolic

and diastolic heart failure.  He has a chronic right bundle branch and left anterior

fascicular block.  He was seen to have paroxysmal episodes of atrial flutter and was

started on amiodarone, but was found to have liver congestion and there is concern

for ongoing amiodarone therapy with his liver enzymes being elevated.  There is

currently evaluation ongoing in regard to his thrombocytopenia and his anemia to

assess for any potential GI bleeding source.  There are ongoing attempts to diurese

him without further dropping his blood pressure and he does require ongoing

vasopressor support with milrinone and dobutamine.  At this point, he is maintaining

sinus rhythm and has only been on low-dose amiodarone with oral dosing for a few

days.  In the setting of left ventricular systolic heart failure, Tikosyn could be a

consideration for alternate antiarrhythmic therapy, however, with his ongoing

milrinone and dobutamine drip, this does place him at increased risk for

proarrhythmic effect and ventricular arrhythmia arrest. 



RECOMMENDATION:  Would be to stop the amiodarone and to monitor his heart rhythm.

Given the relatively short duration of his amiodarone therapy, Tikosyn could be

consideration after short washout if recurrences are seen.  Based on his current

creatinine clearance of 54, a dose of 250 mcg b.i.d. would be appropriate, but would

require in-hospital monitoring for at least three days. 



The patient has been discussed at length with Dr. Augustin, who agreed to this plan

and recommendations.  He is available for any further input if desired. 



Thank you for allowing us to participate in the care of this patient.  We will

continue to follow and manage his arrhythmia with amiodarone stopped. 







Job ID:  [id number]

## 2020-10-06 NOTE — PRG
DATE OF SERVICE:  10/06/2020



SUBJECTIVE:  Mr. Hsu is telling me that [facility] has denied him because his

platelet count is too low.  He is currently on dobutamine and milrinone and looks

stable. 



OBJECTIVE:  VITAL SIGNS:  Temperature 97.5, pulse 102, and blood pressure 86/46. 

HEENT:  Unremarkable. 

NECK:  No adenopathy or JVD. 

LUNGS:  He has few crackles in the bases. 

CARDIAC:  S1, S2.  Distant. 

ABDOMEN:  Soft. 

EXTREMITIES:  No edema.



LABORATORY DATA:  Platelet count is 29, white blood cell count 4.2, and hematocrit

26.7.  Sodium 130, potassium 5.3, chloride 101, CO2 of 19, BUN 60, creatinine 1.5,

and glucose 206. 



ASSESSMENT:  

1. Systolic and diastolic heart failure.

2. Thrombocytopenia.

3. Developing renal insufficiency.

4. Elevated liver enzymes.



PLAN:  

1. Continue supportive care.

2. Hematology has been consulted to work up the thrombocytopenia.  No additional

recommendations at this time. 







Job ID:  [id number]

## 2020-10-07 ENCOUNTER — HOSPITAL ENCOUNTER (INPATIENT)
Dept: HOSPITAL 92 - CDU | Age: 75
LOS: 1 days | Discharge: HOSPICE | DRG: 951 | End: 2020-10-08
Attending: FAMILY MEDICINE | Admitting: FAMILY MEDICINE
Payer: COMMERCIAL

## 2020-10-07 VITALS — TEMPERATURE: 97.5 F

## 2020-10-07 DIAGNOSIS — R74.8: ICD-10-CM

## 2020-10-07 DIAGNOSIS — Z51.5: Primary | ICD-10-CM

## 2020-10-07 DIAGNOSIS — D47.3: ICD-10-CM

## 2020-10-07 DIAGNOSIS — D73.1: ICD-10-CM

## 2020-10-07 DIAGNOSIS — I50.84: ICD-10-CM

## 2020-10-07 DIAGNOSIS — D69.6: ICD-10-CM

## 2020-10-07 LAB
ANION GAP SERPL CALC-SCNC: 16 MMOL/L (ref 10–20)
BASOPHILS # BLD AUTO: 0 THOU/UL (ref 0–0.2)
BASOPHILS NFR BLD AUTO: 0 % (ref 0–1)
BUN SERPL-MCNC: 71 MG/DL (ref 8.4–25.7)
CALCIUM SERPL-MCNC: 9.2 MG/DL (ref 7.8–10.44)
CHLORIDE SERPL-SCNC: 99 MMOL/L (ref 98–107)
CO2 SERPL-SCNC: 19 MMOL/L (ref 23–31)
CREAT CL PREDICTED SERPL C-G-VRATE: 44 ML/MIN (ref 70–130)
EOSINOPHIL # BLD AUTO: 0 THOU/UL (ref 0–0.7)
EOSINOPHIL NFR BLD AUTO: 0 % (ref 0–10)
GLUCOSE SERPL-MCNC: 226 MG/DL (ref 83–110)
HGB BLD-MCNC: 9.6 G/DL (ref 14–18)
LYMPHOCYTES # BLD: 0.4 THOU/UL (ref 1.2–3.4)
LYMPHOCYTES NFR BLD AUTO: 6.3 % (ref 21–51)
MAGNESIUM SERPL-MCNC: 2.4 MG/DL (ref 1.6–2.6)
MCH RBC QN AUTO: 31.2 PG (ref 27–31)
MCV RBC AUTO: 93.6 FL (ref 78–98)
MONOCYTES # BLD AUTO: 0.4 THOU/UL (ref 0.11–0.59)
MONOCYTES NFR BLD AUTO: 6.4 % (ref 0–10)
NEUTROPHILS # BLD AUTO: 4.9 THOU/UL (ref 1.4–6.5)
NEUTROPHILS NFR BLD AUTO: 87.4 % (ref 42–75)
PF4 HEPARIN CMPLX IGG SERPL IA: 0.05 OD (ref 0–0.4)
PLATELET # BLD AUTO: 12 THOU/UL (ref 130–400)
POTASSIUM SERPL-SCNC: 5.5 MMOL/L (ref 3.5–5.1)
RBC # BLD AUTO: 3.08 MILL/UL (ref 4.7–6.1)
SODIUM SERPL-SCNC: 128 MMOL/L (ref 136–145)
WBC # BLD AUTO: 5.7 THOU/UL (ref 4.8–10.8)

## 2020-10-07 RX ADMIN — Medication SCH MLS: at 14:13

## 2020-10-07 RX ADMIN — ALBUMIN HUMAN SCH: 250 SOLUTION INTRAVENOUS at 11:02

## 2020-10-07 RX ADMIN — ALBUTEROL SULFATE SCH MG: 2.5 SOLUTION RESPIRATORY (INHALATION) at 08:45

## 2020-10-07 RX ADMIN — ALBUTEROL SULFATE SCH MG: 2.5 SOLUTION RESPIRATORY (INHALATION) at 12:25

## 2020-10-07 RX ADMIN — INSULIN HUMAN PRN UNIT: 100 INJECTION, SOLUTION PARENTERAL at 05:49

## 2020-10-07 RX ADMIN — ALBUMIN HUMAN SCH GM: 250 SOLUTION INTRAVENOUS at 05:48

## 2020-10-07 RX ADMIN — INSULIN HUMAN PRN UNIT: 100 INJECTION, SOLUTION PARENTERAL at 10:59

## 2020-10-07 RX ADMIN — Medication SCH ML: at 08:29

## 2020-10-07 RX ADMIN — ALBUMIN HUMAN SCH GM: 250 SOLUTION INTRAVENOUS at 00:03

## 2020-10-07 RX ADMIN — ALBUTEROL SULFATE SCH: 2.5 SOLUTION RESPIRATORY (INHALATION) at 18:21

## 2020-10-07 RX ADMIN — ALBUTEROL SULFATE SCH: 2.5 SOLUTION RESPIRATORY (INHALATION) at 16:03

## 2020-10-07 RX ADMIN — DOBUTAMINE HYDROCHLORIDE SCH MLS: 200 INJECTION INTRAVENOUS at 10:19

## 2020-10-07 RX ADMIN — DOBUTAMINE HYDROCHLORIDE SCH MLS: 200 INJECTION INTRAVENOUS at 01:34

## 2020-10-07 NOTE — EKG
Test Reason : 

Blood Pressure : ***/*** mmHG

Vent. Rate : 075 BPM     Atrial Rate : 075 BPM

   P-R Int : 280 ms          QRS Dur : 160 ms

    QT Int : 444 ms       P-R-T Axes : -23 128 -34 degrees

   QTc Int : 495 ms

 

Sinus rhythm with sinus arrhythmia with 1st degree A-V block

Right bundle branch block

T wave abnormality, consider inferior ischemia

Abnormal ECG

 

Confirmed by LALY MENDOZA, MELANIE (12),  NICHOLAS SERRANO ([id number]) on 10/7/2020 4:53:47 PM

 

Referred By:             Confirmed By:MELANIE RUFFIN MD

## 2020-10-07 NOTE — PDOC.PALPN
Palliative Progress Note





- Subjective


Understanding of poor prognosis. Platelets remain sub therapeutic despite 

transfusion. Remains on pressure support. Nighat his caregiver is at bedside. 








- Objective


Vital Signs: 


                            Vital Signs - Most Recent











Temp Pulse Resp BP Pulse Ox


 


 98.1 F   100   15   85/48 L  96 


 


 10/07/20 12:00  10/07/20 12:25  10/07/20 12:25  10/06/20 12:45  10/07/20 12:25














- Physical Exam


Constitutional: ill appearing


HEENT: EOMI, moist MMs, sclera anicteric


Respiratory: diminished lung sound, labored respirations


Deviation from normal: Adventicious bilaterally


Deviation from normal: murmur, intermittant tachycardia


Gastrointestinal: soft, non-tender


Genitourinary: continent


Musculoskeletal: edema present


Neurology: moves all 4 limbs, no focal deficits


Skin: fragile


Psychiatric: A&O x 3, normal affect





- Assessment


(1) Acute on chronic congestive heart failure


Code(s): I50.9 - HEART FAILURE, UNSPECIFIED   Current Visit: Yes   Status: Acute

  





(2) Palliative care encounter


Code(s): Z51.5 - ENCOUNTER FOR PALLIATIVE CARE   Current Visit: Yes   Status: 

Acute   





(3) Idiopathic thrombocythemia


Code(s): D47.3 - ESSENTIAL (HEMORRHAGIC) THROMBOCYTHEMIA   Current Visit: Yes   

Status: Acute   





(4) Diabetes


Code(s): E11.9 - TYPE 2 DIABETES MELLITUS WITHOUT COMPLICATIONS   Current Visit:

Yes   Status: Acute   





(5) Elevated liver enzymes


Code(s): R74.8 - ABNORMAL LEVELS OF OTHER SERUM ENZYMES   Current Visit: Yes   

Status: Acute   





- Plan


Plan:


Mr Hsu is electing to transition to hospice. Understanding of prognosis.  His

daughter and daughter-in-law are en route to be with him. Nighat is at bedside. 

Discussion on how to "Make today a good day". Mr Hsu expressed being at peace

with his decision, and will elect to stop pressure support after visiting with 

his family.





Emotional support offered





Communicated with Spiritual Care





Please also refer to Palliative Care notes in note section

















[30] minutes spent on this encounter with >50% of the time in counseling and 

coordination of care.














- ROS


Constitutional: alert, weakness


ENT: alteration in dentition, dry mouth


Respiratory: shortness of breath, shortness of breath with extertion


Cardiology: edema


Gastrointestinal: other (Denies abdominal pain, nausea or vomiting. )


Genitourinary: other (Denies hematuria, dysuria)


Skin: other (Denies rash or puritis)


Psychological: other (denies anxiety or aggitation)

## 2020-10-07 NOTE — PRG
DATE OF SERVICE:  10/07/2020



SUBJECTIVE:  Mr. Hsu's status is unchanged.  Unfortunately, his platelet count

has dropped significantly.  His platelet count this morning was 12,000 after

receiving platelets yesterday.  He continues to be on steroid therapy.  He is on

__________ dobutamine for blood pressure support. 



OBJECTIVE:  VITAL SIGNS:  Blood pressure 104/70, pulse 103, respirations 20. 

LUNGS:  Crackles noted bilaterally. 

HEART:  __________ rate and rhythm. 

ABDOMEN:  Soft, nontender, nondistended. 

EXTREMITIES:  2+ pitting edema.



PERTINENT LABORATORY DATA:  Hemoglobin 9.6, platelet count 12,000.



IMPRESSION:  

1. Acute-on-chronic systolic heart failure.

2. Paroxysmal atrial fibrillation.

3. Coronary artery disease.

4. Status post bypass surgery.

5. Thrombocytopenia.



RECOMMENDATIONS:  Unfortunately, Mr. Hsu's platelet count continues to drop.

This portends a poor prognosis.  He is not felt to be an appropriate candidate for

LVAD due to thrombocytopenia.  He has not responded to steroid therapy or to

platelet infusion. 



Unfortunately, options appear limited.  He has been on pressure support over the

last 9 days without significant improvement.  We will likely consider hospice. 







Job ID:  [id number]

## 2020-10-07 NOTE — PDOC.BPN
- Brief Progress Note


Encounter Date: 10/07/20


Encounter Time: 17:30





Mr. Hsu is admitted to inpatient hospice service with [facility], 

under Dr. Lester, attending. His hospice admission diagnosis is end stage CHF. He 

is currently on dobutamine and vasopressin drips. The plan is to discontinue 

those drips this evening after all family members have had the opportunity to 

say goodbye. It is expected that patient will pass rather quickly after 

discontinuation of drips. Hospice admission orders were signed and in chart.


May contact the resident service for needs.

## 2020-10-07 NOTE — EKG
Test Reason : 

Blood Pressure : ***/*** mmHG

Vent. Rate : 100 BPM     Atrial Rate : 100 BPM

   P-R Int : 248 ms          QRS Dur : 138 ms

    QT Int : 354 ms       P-R-T Axes : 049 118 -58 degrees

   QTc Int : 456 ms

 

Sinus rhythm with 1st degree A-V block

Right bundle branch block

Left posterior fascicular block

*** Bifascicular block ***

T wave abnormality, consider inferior ischemia

Abnormal ECG

When compared with ECG of 04-OCT-2020 09:34, (Unconfirmed)

CO interval has increased

T wave inversion more evident in Anterior leads

Confirmed by DR. AMAYA PIÑA (3) on 10/7/2020 7:02:36 AM

 

Referred By:  TONG           Confirmed By:DR. AMAYA PIÑA

## 2020-10-07 NOTE — PRG
DATE OF SERVICE:  10/07/2020



SUBJECTIVE:  The patient continues to do poorly, not responding to any kind of

therapy. 



OBJECTIVE:  VITAL SIGNS:  Temperature 97.4, pulse 100, blood pressure 107/71, O2

saturation 94%.  He was covered on dobutamine and norepinephrine. 

HEENT:  Unchanged. 

NECK:  No JVD. 

LUNGS:  Clear. 

CARDIAC:  S1 and S2 distant. 

ABDOMEN:  Protuberant. 

EXTREMITIES:  Edematous.



LABORATORY DATA:  Sodium 128, potassium 5.5, chloride 99, CO2 of 19, BUN 71,

creatinine 1.8, glucose 226.  White blood cell count 5.7, hematocrit 28.8, and

platelet count 12. 



ASSESSMENT:  

1. Diastolic and systolic heart failure - end stage.

2. 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

3. Passive liver congestion from heart failure.

4. Azotemia from heart failure.



PLAN:  There really is nothing to offer Mr. Hsu at this point.  He has been

unresponsive to all therapy and is not a candidate for LVAD.  My recommendation

would be hospice care and stop checking labs and cut back on his many medications as

possible. 







Job ID:  [id number]

## 2020-10-07 NOTE — PRG
DATE OF SERVICE:  10/07/2020



SUBJECTIVE:  Mr. Conrad Hsu had a declining day.  At 1st, he was doing well 
on

combination of dobutamine 5 mcg/kg/minute and milrinone 0.125 mcg/kg/minute.

However, his blood pressures started to decrease today.  Milrinone had to be 
stopped

and norepinephrine had to be restarted and dobutamine also had to be titrated 
up.

We had difficulty in maintaining systolic blood pressure all day.  It was also 
noted

his hemoglobin was dropping despite transfusion, so he received another unit of

blood.  That helped some, but not sufficiently.  The nurse noticed there could 
be a

hematoma on the right flank.  A CT scan was also done.  They did not find any 
acute

bleed or hematoma.  He was able to sleep well overnight and he woke up this 
morning

more short of breath, and then he had some episodes of nausea. 



REVIEW OF SYSTEMS:  GENERAL:  There is no fever or chills, but there is now some

cough. 

HEENT:  There is no change in vision, hearing, or swallowing. 

PULMONARY:  Please see HPI.  He is now more short of breath. 

CARDIAC:  There is no chest pain, palpitations, syncope. 

GI:  Please see HPI. 

:  He had minimal response to Lasix 80 mg IV, but he has some difficulty to

urinate into urinal. 

MUSCULOSKELETAL:  He has right shoulder pain. 

INTEGUMENT:  There is no new skin breakdown. 

NEUROLOGIC:  There are no acute focal deficits or weaknesses.



MEDICATIONS:  That medications that he is currently on are:

1. Albumin 25 g IV q.6 hours.

2. Albuterol nebs q.4 hours.

3. Amiodarone 200 mg p.o. b.i.d., this will be discontinued.

4. Digoxin 0.125 mg daily.

5. He is on dobutamine currently at 12 mcg/kg/minute.

6. He is also on norepinephrine at 7 mcg/minute.

7. He is on moderate scale sliding insulin.

8. He is on magnesium oxide 400 mg daily.

9. He is on Zofran 4 mg IV q.6 hours p.r.n.

10. Protonix 40 mg daily.

11. Prednisone 50 mg b.i.d.

12. Vasopressin 0.04 units/minute if his MAPs of below 65 when both dobutamine 
is at

12 and vasopressin is at 8.  Currently is not in use. 



Telemetry was reviewed.  He is currently most likely to be sinus tachycardia at 
a

rate about 100 to 105.  But there are few PACs and PVCs.  There is no concerning

arrhythmia. 



PHYSICAL EXAMINATION:

VITAL SIGNS:  Latest vitals are heart rate 102, blood pressure 106/65. 

GENERAL:  He is short of breath, appearing fatigued, appearing nauseated while

sitting in bed.  However, he is alert.  He only can speak in short sentences 
without

being short of breath. 

HEENT:  EOMI.  Oropharynx with moist mucosa. 

NECK:  JVP is very elevated, at least 14 cm to the earlobe. 

PULMONARY:  He has bilateral crackles in lower 1/2 lung base.  He has much more

pulmonary edema today. 

CARDIAC:  Tachycardic.  There is 2/6 holosystolic murmur at the left sternal 
border.

 There is 2/6 holosystolic murmur at the apex. 

ABDOMEN:  More distended. 

EXTREMITIES:  His hips have some pitting edema. 

There is at least 2 cm pitting edema from the feet towards the knees.  Thus, he 
is

much more volume overloaded today. 



LABORATORY VALUES:  White cell count 5.9, hemoglobin 9.6, platelets only 12. 



His chemistries show sodium 128, potassium 5.5, chloride 99, bicarb 19, BUN 71,

creatinine 1.9. 



ASSESSMENT:  74-year-old gentleman is reaching the end from combined

biventricular failure.  He resides in American Heart Association stage D, and 
[place] Heart Association class 4 heart failure with reduced ejection fraction.  He
has

both systolic and diastolic dysfunction.  Worst, he has severe right ventricular

failure too.  The severe right ventricular failure is causing severe amount of

edema.  This is in turn worsened his thrombocytopenia, it will cause much more

platelet sequestration in the spleen.  He also has likely hepatic dysfunction 
due to

congestion.  Furthermore, he has unclear source of blood loss and his kidneys 
are

shutting down.  He is minimally responsive to Lasix at 80 mg IV.  At this

combination showed that his heart has no reserve.  We have been pushing his 
heart harder 

and harder with everything that we can muster.

Unfortunately, it is beyond response.  Thus, the best option at this point is

hospice. 



The possibility of hospice was discussed with the patient and his significant 
other,

Nighat.  They agree to this.  However, the patient wants the drips to be 
continued

until he has a chance to see his family members. 



Please see the following for my recommendations.



RECOMMENDATIONS:  

1. Torsemide 5 mg p.o. 1 dose now.

2. Lasix 80 mg IV 1 dose now.

3. If inadequate response, that meaning less than 200 mL in 1 hour, please start

Lasix 10 mg/hour drip. 

4. The above 3 combinations needed to provide some volume.  We will need to buy 
some

air space, so he has more comfortable time speaking to his family during the 
day. 

5. Please consult hospice.  He will need to be inpatient hospice.  I suspect he 
will

not survive very long at all without these on inotropic drips. 

6. Discontinue amiodarone.

7. Please use whatever comfort medication as you see fit. 



It has been a pleasure of taking care Mr. Hsu.  If you have any questions, 
please

give me a call. 



The total ICU time spent over last 24 hours is at least 70 minutes.  This 
includes

frequent visits, titration of inotropic drips.  I personally performed a history
and

physical, coordinating care with multiple services including [facility] in

[place], holding family conferences and also direct patient interaction. 







Job ID:  [id number]



MTDD

## 2020-10-07 NOTE — PRG
DATE OF SERVICE:  10/07/2020



SUBJECTIVE:  Mr. Hsu gets short of breath when he moves around, but otherwise he

has no pain or other complaints. 



OBJECTIVE:  VITAL SIGNS:  Temperature 98.1, pulse 100, blood pressure 104/65. 

GENERAL:  He is in no acute distress.  Alert and oriented x3. 

LUNGS:  Clear to auscultation bilaterally. 

HEART:  Regular rate and rhythm. 

ABDOMEN:  Soft, nontender, and nondistended.  Bowel sounds are present. 

EXTREMITIES:  2+ pitting lower extremity edema.



IMPRESSION:  

1. Anemia.  His hemoglobin responded well to transfusion.

2. End-stage liver disease.  This is further decompensated with congestive

hepatopathy. 

3. Chronic renal insufficiency.

4. Congestive heart failure, end-stage.



PLAN:  

1. He is being transitioned to hospice care for his heart failure.

2. I will sign off for now.  Please call if GI can be of assistance.







Job ID:  [id number]

## 2020-10-08 VITALS — TEMPERATURE: 97.8 F

## 2020-10-08 NOTE — PDOC.FM
- Subjective


Subjective: 


Patient was resting comfortably in bed with his daughter at bedside at the time 

of evaluation. A lengthy discussion was had with the patient's daughter about 

expectations with regard to End of Life Care - all questions answered and 

additional resources were discussed, [facility]. Per the patient's daughter, the patient's grandson will be flying to 

[place] from [place] later this AM.





Of note, the hospital EMR was unavailable prior to the evaluation and vital 

signs could not be reviewed appropriately. However, there were no issues 

reported by the Resident Night Team or the Nursing Staff.





- Objective


Vital Signs & Weight: 


                             Vital Signs (12 hours)











  Temp Pulse Ox


 


 10/08/20 00:00  97.9 F 


 


 10/07/20 21:35   81 L








                            Most Recent Monitor Data











Heart Rate from ECG            86


 


NIBP                           83/54


 


NIBP BP-Mean                   63


 


Respiration from ECG           13


 


SpO2                           85














I&O: 


                                        











 10/07/20 10/08/20 10/09/20





 06:59 06:59 06:59


 


Intake Total  240 


 


Output Total  0 0


 


Balance  240 0














Phys Exam





- Physical Examination


Constitutional: NAD


HEENT: moist MMs, oral pharynx no lesions


Neck: supple


Respiratory: no wheezing, no rhonchi


Scant crackles heard intermittently


Cardiovascular: RRR, no significant murmur, no rub


Gastrointestinal: soft, non-tender, no distention, positive bowel sounds


Musculoskeletal: pulses present


+3 pitting edema to the knees, bilaterally


Neurological: non-focal





Dx/Plan


(1) Acute on chronic congestive heart failure


Code(s): I50.9 - HEART FAILURE, UNSPECIFIED   Status: Acute   





(2) Elevated liver enzymes


Code(s): R74.8 - ABNORMAL LEVELS OF OTHER SERUM ENZYMES   Status: Acute   





(3) Hypersplenism


Code(s): D73.1 - HYPERSPLENISM   Status: Acute   





(4) Idiopathic thrombocythemia


Code(s): D47.3 - ESSENTIAL (HEMORRHAGIC) THROMBOCYTHEMIA   Status: Acute   





(5) Palliative care encounter


Code(s): Z51.5 - ENCOUNTER FOR PALLIATIVE CARE   Status: Acute   





(6) Thrombocytopenia


Code(s): D69.6 - THROMBOCYTOPENIA, UNSPECIFIED   Status: Acute   





- Plan


Plan: 


Patient is a 75 y/o  male with a PMH significant for CHF who is 

currently being care for by [facility] for Hospice Care.





1. End-Stage CHF requiring Hospice Care


-Patient recently taken off of multiple gtts in an ICU setting - understanding 

of current prognosis


-Patient is stable with family at bedside - will most likely transfer to Medical

Floor or In-Patient Hospice later this AM


-Will provide symptomatic management for Agitation/Anxiety, Pain, SOB, etc.


-Will continue to provide emotional support and make sure that family is aware 

of  Services, Grief Counseling, etc.





Dispo: Patient is currently stable and admitted to the CCU for End-Stage CHF 

requiring Hospice Care. Will assist as needed with all End of Life Care 

discussions, symptomatic management. Expected LOS < 24H.





Addendum - Attending





- Attending Attestation


Date/Time: 10/08/20 1752





I personally evaluated the patient and discussed the management with Dr. Oquendo.


I agree with the History, Examination, Assessment and Plan documented above with

any addition or exceptions noted below.

## 2020-10-09 NOTE — DIS
DATE OF ADMISSION:  2020



DATE OF DISCHARGE:  10/07/2020



RESIDENT:  Santos Oquendo M.D.



ADMITTING ATTENDING:  Darryn Randall M.D.



DISCHARGE ATTENDIN. Miguel Ramos M.D.

2. Conrad Lester M.D.



CONSULTS:  

1. Salvador Spence M.D., Pulmonology.

2. Barber Merrill M.D., Cardiology.

3. Sebastian Velasquez M.D., Electrophysiology.

4. Palliative Care.

5. Kaylah Blackwood, nurse practitioner, Oncology.

6. Alejandro Thomas M.D., GI.

7. Omer Augustin M.D. 

8. Vikki Gonzalez, PRIMITIVO.

9. Michael Shukla M.D., GI.



PROCEDURES:  

1. Chest x-ray performed on 2020, which demonstrated no acute cardiopulmonary

processes; however, chronic lung parenchymal changes were noted. 

2. EKG performed on 2020, which demonstrated sinus rhythm with sinus

arrhythmia, first-degree AV block, as well as a right bundle-branch block, T-wave

abnormalities, and possible inferior ischemia. 

3. Chest thorax CTA performed on 2020, which demonstrated no evidence of

pulmonary artery embolism to the level of segmental arteries, cirrhosis, portal

hypertension and varices, cardiomegaly, splenomegaly and cholelithiasis. 

4. Chest x-ray performed on 2020, which demonstrated uncomplicated left

subclavian central venous catheter placement. 

5. Chest x-ray performed on 10/01/2020, which demonstrated stable exam with previous

evaluation. 

6. Echocardiogram performed on 10/02/2020, which revealed an ejection fraction with

an estimated 25% to 30% as well as grade 3 diastolic dysfunction, moderate tricuspid

regurgitation and mildly elevated pulmonary artery pressure. 

7. EKG performed on 10/03/2020, which demonstrated atrial fibrillation with rapid

ventricular response, right bundle branch block, T-wave abnormality with inferior

ischemia. 

8. EKG performed on 10/04/2020, which demonstrated atrial tachycardia, marked ST

abnormality, possible septal subendocardial injury, right bundle-branch block, left

posterior fascicular block, bifascicular block. 

9. Abdominal ultrasound performed on 10/04/2020, which demonstrated nonvisualization

of the gallbladder, although recent CT showed a gallstone within a contracted

gallbladder.  The common duct is normal in caliber as well as heterogeneous

echogenicity to the liver with suggestion of nodular cirrhotic contour and

splenomegaly and mild ascites. 

10. Pelvic ultrasound, which demonstrated minimal ascites, otherwise unremarkable

pelvic ultrasound. 

11. EKG performed on 10/06/2020, which demonstrated sinus rhythm with first-degree

AV block, right bundle-branch block, left posterior fascicular block, bifascicular

block, T-wave abnormalities with consideration for inferior ischemia and moderately

increased UT interval when compared to previous EKG. 

12. Chest, abdomen, pelvis CT scan performed on 10/06/2020, which demonstrated no

evidence of hematoma in the chest, abdomen, or pelvis.  Findings suggesting

cirrhosis, complex ascites, splenomegaly, varices, pleural effusion with

atelectasis, soft tissue edema that has developed since CT angiogram of the chest on

2020, probable gallstones in the severely contracted gallbladder, aneurysmal

dilatation of the infrarenal abdominal aorta. 



PRIMARY DIAGNOSIS:  End-stage congestive heart failure.



SECONDARY DIAGNOSES:  

1. Idiopathic thrombocythemia.

2. Type 2 diabetes.

3. Transaminitis.

4. Thrombocytopenia.

5. Hypersplenism.

6. Anemia.



DISCHARGE MEDICATIONS:  

1. Acetaminophen 650 mg p.r. q.4 hours p.r.n.

2. Alprazolam 0.25 mg p.o. q.6 hours p.r.n.

3. Thorazine 50 mg IM q.4 hours p.r.n., Thorazine 25 mg IM q.4 hours p.r.n.

4. Diphenhydramine 25 mg p.o. q.6 hours p.r.n., diphenhydramine 25 mg IV push q.6

hours p.r.n. 

5. Haloperidol 1 mg p.o. q.2 hours p.r.n., haloperidol 5 mg IV push q.6 hours p.r.n.

6. Hyoscyamine sulfate 0.125 mg sublingual four times daily p.r.n.

7. Loperamide 2 mg p.o. p.r.n.

8. Lorazepam 2 mg p.o. q.4 hours p.r.n., lorazepam 1 mg p.o. q.4 hours p.r.n.,

lorazepam 2 mg IV push q.4 hours p.r.n., lorazepam 1 mg IV push q.4 hours p.r.n. 

9. Magnesium hydroxide 30 mL p.o. daily p.r.n.

10. Morphine 2 mg IV push q.2 hours p.r.n., morphine 4 mg IV push q.2 hours p.r.n.,

morphine 15 mg immediate release tablet p.o. q.2 hours p.r.n. 

11. Zofran 4 mg p.o. q.6 hours p.r.n., Zofran 4 mg IV push q.6 hours p.r.n.

12. Phenergan 25 mg p.r. q.6 hours p.r.n.

13. Scopolamine 3 mg topical q.3 days p.r.n., scopolamine 1.5 mg topical q.3 days

p.r.n. 

14. Senokot two tabs p.o. daily p.r.n.

15. Ambien 5 mg p.o. q.p.m. p.r.n.

16. Thorazine 25 mg IV push q.4 hours p.r.n., Thorazine 50 mg IV push q.4 hours

p.r.n. 

17. Tylenol 650 mg p.o. q.4 hours p.r.n., Tylenol 650 mg p.r. q.4 hours p.r.n.



DISCONTINUED MEDICATIONS:  None.



HISTORY OF PRESENT ILLNESS AND HOSPITAL COURSE:  The patient is a 74-year-old

 male with a long history of CHF, who was noted by his caretaker on the

evening of admission to have a systolic blood pressure in the 60s, at which point he

was told that she thought that he needed to come to the emergency room.  The patient

finally agreed to transportation to the emergency room after 2 to 3 hours and did in

fact arrive in the ER with a systolic blood pressure in the 70s.  At which point,

workup was begun showing severe abnormalities.  However, the patient denied chest

pain or shortness of breath, nor did he have any diaphoresis, nausea, vomiting, or

any other unusual discomfort.  The patient's blood sugars were running in the 140s.

However, patient has renal insufficiency with a BUN of 54 and a creatinine of 1.9.

Patient also has hyperkalemia with potassium of 6.5.  The patient had a D-dimer that

revealed to be 5.7, but this was found to be a false-positive after CT scan did not

reveal any evidence of pulmonary embolism.  The patient arrived with a lactic acid

of 4.7 and a CK-MB of 10.5 with troponins of 2.2.  The patient's primary care

provider, Dr. Randall, was contacted by the ER physician to place him in an ICU bed so

that a dobutamine drip could be utilized to keep systolic blood pressure above 90,

thereby allowing effective diuresis with furosemide.  The patient was subsequently

stabilized and transferred to the ICU where he was evaluated by multiple specialists

with multiple imaging modalities and monitoring performed as mentioned elsewhere in

this document.  However, the patient's overall prognosis was poor and as such, on

10/07/2020, the patient in conjunction with the patient's family elected to pursue

hospice care rather than ongoing aggressive medical management. As such, the patient

was started on comfort measures and was transferred to the Family Medicine

residents, who orchestrated transferred to [facility].  The patient was

stable on the morning of 10/08/2020, lucid, A and O x3 and was able to participate

with the evaluation and converse pleasantly with his family and hospital staff.

Following opening availability of a bed at [facility], the patient was

subsequently prepped for discharge.  Prior to discharge, patient's vital signs were

recorded as temperature 97.9, oxygen saturation 86% on room air, heart rate 85 beats

per minute, blood pressure 85/59, respirations 16 breaths per minute. 



LABORATORY ANALYSIS:  Revealed a white blood cell count of 5.7, hemoglobin 9.6,

hematocrit 28.8, platelet count 12.  Coagulation panel revealed a D-dimer of 5.76 as

mentioned elsewhere in this document.  Blood gas taken on 2020 the arterial

collection was found to have a pH of 7.4, pCO2 of 27, and PO2 of 84.  Chem panel

revealed a sodium of 128, potassium 5.5, chloride 99, carbon dioxide 19, BUN 71,

creatinine 1.98, glucose 211.  Hemoglobin A1c 5.7.  Lactic acid 3.4, down trended

from 4.7, calcium 9.2, magnesium 2.4, iron 13, total iron binding capacity 301,

total iron binding capacity percent saturation 4, ferritin 46.97, total bilirubin 3.

 AST 79, down from max of 179; , down from a max of 283; alkaline phosphatase

105, down from a max of 113. Creatine kinase 83; CK-MB 6.8, down from a max of 10.5;

troponins 1.919, down from a max of 2.204.  BNP 7841.3.  Triglycerides 75,

cholesterol 119, LDL 75, HDL 29.  Lipase 19.  TSH 0.6856.  Cortisol 20.2.  Cortisol

response see EMR. Heparin-induced antibody 0.049 and within normal limits.

Serology:  COVID-19 negative. 



DISPOSITION:  Guarded.



DISCHARGE INSTRUCTIONS:  Location:  [facility]. 



Diet:  No restrictions. 



Activity:  No restrictions. 



Followup: The patient will not require followup as he will be at the inpatient

[facility] and will be evaluated by the attending physician

there.  The patient and the patient's family were all in agreement on the plan of

care and were pleased with the service that they were provided while in the

hospital. 







Job ID:  [id number]